# Patient Record
Sex: FEMALE | Race: WHITE | NOT HISPANIC OR LATINO | Employment: OTHER | URBAN - METROPOLITAN AREA
[De-identification: names, ages, dates, MRNs, and addresses within clinical notes are randomized per-mention and may not be internally consistent; named-entity substitution may affect disease eponyms.]

---

## 2017-06-28 ENCOUNTER — ALLSCRIPTS OFFICE VISIT (OUTPATIENT)
Dept: OTHER | Facility: OTHER | Age: 62
End: 2017-06-28

## 2017-06-28 PROCEDURE — G0145 SCR C/V CYTO,THINLAYER,RESCR: HCPCS | Performed by: OBSTETRICS & GYNECOLOGY

## 2017-06-28 PROCEDURE — 88141 CYTOPATH C/V INTERPRET: CPT | Performed by: OBSTETRICS & GYNECOLOGY

## 2017-06-28 PROCEDURE — 87624 HPV HI-RISK TYP POOLED RSLT: CPT | Performed by: OBSTETRICS & GYNECOLOGY

## 2017-06-29 ENCOUNTER — LAB REQUISITION (OUTPATIENT)
Dept: LAB | Facility: HOSPITAL | Age: 62
End: 2017-06-29
Payer: COMMERCIAL

## 2017-06-29 DIAGNOSIS — Z01.419 ENCOUNTER FOR GYNECOLOGICAL EXAMINATION WITHOUT ABNORMAL FINDING: ICD-10-CM

## 2017-07-13 LAB — HPV RRNA GENITAL QL NAA+PROBE: NORMAL

## 2017-07-14 LAB
LAB AP GYN PRIMARY INTERPRETATION: NORMAL
Lab: NORMAL
PATH INTERP SPEC-IMP: NORMAL

## 2017-07-17 ENCOUNTER — GENERIC CONVERSION - ENCOUNTER (OUTPATIENT)
Dept: OTHER | Facility: OTHER | Age: 62
End: 2017-07-17

## 2017-08-23 ENCOUNTER — APPOINTMENT (OUTPATIENT)
Dept: RADIOLOGY | Facility: MEDICAL CENTER | Age: 62
End: 2017-08-23
Payer: COMMERCIAL

## 2017-08-23 ENCOUNTER — TRANSCRIBE ORDERS (OUTPATIENT)
Dept: ADMINISTRATIVE | Facility: HOSPITAL | Age: 62
End: 2017-08-23

## 2017-08-23 DIAGNOSIS — M25.561 RIGHT KNEE PAIN, UNSPECIFIED CHRONICITY: ICD-10-CM

## 2017-08-23 DIAGNOSIS — M25.562 LEFT KNEE PAIN, UNSPECIFIED CHRONICITY: ICD-10-CM

## 2017-08-23 DIAGNOSIS — M25.562 LEFT KNEE PAIN, UNSPECIFIED CHRONICITY: Primary | ICD-10-CM

## 2017-08-23 PROCEDURE — 73564 X-RAY EXAM KNEE 4 OR MORE: CPT

## 2017-11-03 DIAGNOSIS — Z12.31 ENCOUNTER FOR SCREENING MAMMOGRAM FOR MALIGNANT NEOPLASM OF BREAST: ICD-10-CM

## 2017-11-06 ENCOUNTER — GENERIC CONVERSION - ENCOUNTER (OUTPATIENT)
Dept: OTHER | Facility: OTHER | Age: 62
End: 2017-11-06

## 2017-12-20 ENCOUNTER — ALLSCRIPTS OFFICE VISIT (OUTPATIENT)
Dept: OTHER | Facility: OTHER | Age: 62
End: 2017-12-20

## 2017-12-20 PROCEDURE — 88175 CYTOPATH C/V AUTO FLUID REDO: CPT | Performed by: OBSTETRICS & GYNECOLOGY

## 2017-12-21 NOTE — PROGRESS NOTES
Plan   A cervical Pap smear was repeated at this time  There was no blood or discharge in the vagina  Cervix appeared normal       Chief Complaint   Chief Complaint Free Text Note Form: repap for abnormal pap ascus      History of Present Illness   HPI: This 49-year-old patient has a history of leukoplakia of the vulva for which she takes Temovate cream as needed usually no more than once every 2 weeks  She did have a Pap smear at her yearly visit 2017 which showed ascus negative for high risk human papilloma virus  I have returned her visit today to repeat Pap smear  Active Problems   1  Encounter for gynecological examination without abnormal finding (V72 31) (Z01 419)   2  Encounter for screening mammogram for malignant neoplasm of breast (V76 12)     (Z12 31)   3  Lichen sclerosus et atrophicus (701 0) (L90 0)   4  Pap smear, as part of routine gynecological examination (V76 2) (Z01 419)   5  Vaginitis (616 10) (N76 0)   6  Visit for screening mammogram (V76 12) (Z12 31)    Past Medical History   1  History of Menopausal state (627 2) (N95 1)   2  History of  (normal spontaneous vaginal delivery) (650) (O80)   3  History of Pap smear, as part of routine gynecological examination (V76 2) (Z01 419)   4  History of Visit for screening mammogram (V76 12) (Z12 31)    Surgical History   1  History of Ankle Surgery    Family History   Father    1  Family history of Prostate CA  Maternal Great Grandmother    2  Family history of Ovarian cancer    Social History    · Being A Social Drinker   · Caffeine use (V49 89) (F15 90)   · Never A Smoker   · No drug use    Current Meds    1  Clobetasol Propionate E 0 05 % External Cream; apply bid x 2week then qd for 2 weeks     then every third day then stop; Therapy: [id number] to (Last Rx:2014) Ordered   2  Synthroid 50 MCG Oral Tablet; Therapy: (Recorded:2014) to Recorded    Allergies   1   No Known Drug Allergies    Vitals   Vital Signs Recorded: 11ZTC2222 37:88EP   Systolic 180   Diastolic 88   Height 5 ft 6 in   Weight 187 lb    BMI Calculated 30 18   BSA Calculated 1 94     Physical Exam        Genitourinary      External genitalia: Abnormal  -- Leukoplakia specifically of the inferior branch vagina and there is introitus going down the perineum  Vagina: Normal, no lesions or dryness appreciated  Urethra: Normal        Urethral meatus: Normal        Bladder: Normal, soft, non-tender and no prolapse or masses appreciated  Cervix: Normal, no palpable masses         Future Appointments      Date/Time Provider Specialty Site   07/11/2018 11:30 AM Cristian Rocha MD Obstetrics/Gynecology [facility]     Signatures    Electronically signed by : Mendel Song, MD; Dec 20 2017  2:51PM EST                       (Author)

## 2017-12-22 ENCOUNTER — LAB REQUISITION (OUTPATIENT)
Dept: LAB | Facility: HOSPITAL | Age: 62
End: 2017-12-22
Payer: COMMERCIAL

## 2017-12-22 DIAGNOSIS — R87.610 ATYPICAL SQUAMOUS CELLS OF UNDETERMINED SIGNIFICANCE ON CYTOLOGIC SMEAR OF CERVIX (ASC-US): ICD-10-CM

## 2017-12-28 LAB
LAB AP GYN PRIMARY INTERPRETATION: NORMAL
Lab: NORMAL

## 2018-01-10 NOTE — MISCELLANEOUS
Message   Recorded as Task   Date: 07/17/2017 01:05 PM, Created By: Aaron Putnam   Task Name: Follow Up   Assigned To: Juan Pablo Spicer   Regarding Patient: Deion Shafer, Status: In Progress   Lee Tate - 17 Jul 2017 1:05 PM     TASK CREATED  Call Eileen Hernandez her Pap smear shows ascus negative for high risk HPV  Repeat her Pap in 5 months  Taya Young - 17 Jul 2017 1:34 PM     TASK IN PROGRESS   Taya Young - 17 Jul 2017 2:22 PM     TASK EDITED  MA contacted Pt by phone today  MA informed Pt of pap test result (test performed on 7/14/17) per Dr Debbie Parekh directive  MA informed Pt that test result for high risk HPV was negative, however, test results were inconclusive (ASC-US)  MA further informed Pt that  Dr Radha Oliver recommends repeating Pap test in five months  Pt stated that she is post menopausal   MA informed Pt not to have sex three days prior to Pap test appointment  Pt informed MA that she will comply with Dr Debbie Parekh recommendation and schedule repeat Pap test in five months  Active Problems    1  Encounter for gynecological examination without abnormal finding (V72 31) (Z01 419)   2  Encounter for screening mammogram for malignant neoplasm of breast (V76 12)   (Z12 31)   3  Lichen sclerosus et atrophicus (701 0) (L90 0)   4  Pap smear, as part of routine gynecological examination (V76 2) (Z01 419)   5  Vaginitis (616 10) (N76 0)   6  Visit for screening mammogram (V76 12) (Z12 31)    Current Meds   1  Clobetasol Propionate E 0 05 % External Cream; apply bid x 2week then qd for 2 weeks   then every third day then stop; Therapy: [id number] to (Last Rx:20Jan2014) Ordered   2  Synthroid 50 MCG Oral Tablet (Levothyroxine Sodium); Therapy: (Recorded:20Jan2014) to Recorded    Allergies    1   No Known Drug Allergies    Signatures   Electronically signed by : Joao Quan MA; Jul 17 2017  2:23PM EST                       (Author)

## 2018-01-11 ENCOUNTER — GENERIC CONVERSION - ENCOUNTER (OUTPATIENT)
Dept: OTHER | Facility: OTHER | Age: 63
End: 2018-01-11

## 2018-01-12 NOTE — RESULT NOTES
Verified Results  (B) PAP (REFLEX TO HPV PLUS WHEN ASC-US) 27Jun2016 12:00AM [facility]     Test Name Result Flag Reference   PAP, LIQUID-BASED NILM     DIAGNOSIS:            Negative for intraepithelial lesion or malignancy  ADEQUACY:             Satisfactory for evaluation /                         Endocervical/transformation zone component                         present  COMMENT:              This Pap smear was screened with the assistance                         of the LocalbasePrep(TM) Imaging System and                         screened by a cytotechnologist   SPECIMEN SOURCE:      PAP (RFLX HPV PLUS WHENASC-US), CERVIX  CLINICAL INFORMATION: LMP: N/A                        Post menopausal                        Provided Diagnosis Codes: Z01 419, V72 31                                                Cervicovaginal cytology should be considered a                         screening procedure subject to false negatives                         and false positives  Results are more reliable                         when a satisfactory sample is obtained on a                         regular repetitive basis, and should be                         interpreted together with past and current                         clinical data    ELECTRONICALLY SIGNED   BY:                   Screened By: QUENTIN Miller (ASCP)   Case                         Electronically Signed 06/29/2016

## 2018-01-14 VITALS
BODY MASS INDEX: 30.05 KG/M2 | DIASTOLIC BLOOD PRESSURE: 90 MMHG | WEIGHT: 187 LBS | SYSTOLIC BLOOD PRESSURE: 150 MMHG | HEIGHT: 66 IN

## 2018-01-15 NOTE — RESULT NOTES
Verified Results  (1) THIN PREP PAP WITH IMAGING [id number] 09:47AM [facility]     Test Name Result Flag Reference   LAB AP CASE REPORT (Report)     Gynecologic Cytology Report            Case: [id number]                  Authorizing Provider: Kayla Mosquera MD     Collected:      06/28/2017           First Screen:     Janell Wilsons, CT    Received:      07/03/2017 1104        Pathologist:      Gracie Hurtado MD                                 Specimen:  LIQUID-BASED PAP, SCREENING, Cervix   HPV HIGH RISK RESULT (Report)     HPV, High Risk: HPV NEG, HPV16 NEG, HPV18 NEG      Other High Risk HPV Negative, HPV 16 Negative, HPV 18 Negative  HPV types: 16,18,31,33,35,39,45,51,52,56,58,59,66 and 68 DNA are undetectable or below the pre-set threshold  Roche???s FDA approved Manny 4800 is utilized with strict adherence to the ???s instruction  manual to test for the presence of High-Risk HPV DNA, as well as HPV 16 and HPV 18  This instrument  has been validated by our laboratory and/or by the   A negative result does not preclude the presence of HPV infection because results depend on adequate  specimen collection, absence of inhibitors and sufficient DNA to be detected  Additionally, HPV negative  results are not intended to prevent women from proceeding to colposcopy if clinically warranted  Positive HPV test results indicate the presence of any one or more of the high risk types, but since patients  are often co-infected with low-risk types it does not rule out the presence of low-risk types in patients  with mixed infections  LAB AP GYN PRIMARY INTERPRETATION      Epithelial cell abnormality  Electronically signed by Gracie Hurtado MD on 7/14/2017 at 9:47 AM   LAB AP GYN INTERPRETATION      Atypical squamous cells of undetermined significance   LAB AP GYN SPECIMEN ADEQUACY      Satisfactory for evaluation  (See note)   LAB AP GYN NOTE (Report)     - No endocervical cells identified  It is difficult to differentiate   between squamous metaplastic cells and parabasal cells in atrophic   specimens due to numerous causes including menopause, postpartum changes   and progestational agents  - Interpretation performed at [facility], [address] [facility] [address],   [address] [facility]  LAB AP GYN ADDITIONAL INFORMATION (Report)     The [email or web address]'s FDA approved ,  and ThinPrep Imaging System are   utilized with strict adherence to the 's instruction manual to   prepare gynecologic and non-gynecologic cytology specimens for the   production of ThinPrep slides as well as for gynecologic ThinPrep imaging  These processes have been validated by our laboratory and/or by the     The Pap test is not a diagnostic procedure and should not be used as the   sole means to detect cervical cancer  It is only a screening procedure to   aid in the detection of cervical cancer and its precursors  Both   false-negative and false-positive results have been experienced  Your   patient's test result should be interpreted in this context together with   the history and clinical findings     LAB AP CLINICAL INFORMATION      Encounter for gynecological examination without abnormal finding   LAB AP LMP

## 2018-01-23 VITALS
WEIGHT: 187 LBS | HEIGHT: 66 IN | BODY MASS INDEX: 30.05 KG/M2 | SYSTOLIC BLOOD PRESSURE: 144 MMHG | DIASTOLIC BLOOD PRESSURE: 88 MMHG

## 2018-02-26 NOTE — MISCELLANEOUS
Message   Recorded as Task   Date: 01/11/2018 10:22 AM, Created By: Kate Oneil   Task Name: Call Back   Assigned To: Mally Hathaway   Regarding Patient: Renee Watkins, Status: In Progress   Comment:    Timo Multaniika - 11 Jan 2018 10:22 AM     TASK CREATED  Caller: Self; Results Inquiry; [phone] (Mobile Phone)  Pt is requesting for her pap results on 12/28 she had done  Ingrid Fried - 11 Jan 2018 10:25 AM     TASK IN PROGRESS   Ingrid Fried - 11 Jan 2018 10:27 AM     TASK EDITED  Patient is aware of results  Active Problems    1  Encounter for gynecological examination without abnormal finding (V72 31) (Z01 419)   2  Encounter for screening mammogram for malignant neoplasm of breast (V76 12)   (Z12 31)   3  Lichen sclerosus et atrophicus (701 0) (L90 0)   4  Pap smear abnormality of cervix with ASCUS favoring benign (795 01) (R87 610)   5  Pap smear, as part of routine gynecological examination (V76 2) (Z01 419)   6  Vaginitis (616 10) (N76 0)   7  Visit for screening mammogram (V76 12) (Z12 31)    Current Meds   1  Clobetasol Propionate E 0 05 % External Cream; apply bid x 2week then qd for 2 weeks   then every third day then stop; Therapy: [id number] to (Last Rx:20Jan2014) Ordered   2  Synthroid 50 MCG Oral Tablet (Levothyroxine Sodium); Therapy: (Recorded:20Jan2014) to Recorded    Allergies    1   No Known Drug Allergies    Signatures   Electronically signed by : Yordan Turcios, ; Jan 11 2018 10:27AM EST                       (Author)

## 2018-06-23 DIAGNOSIS — N76.2 ACUTE VULVITIS: Primary | ICD-10-CM

## 2018-06-24 RX ORDER — CLOBETASOL PROPIONATE 0.5 MG/G
CREAM TOPICAL
Qty: 30 G | Refills: 4 | Status: SHIPPED | OUTPATIENT
Start: 2018-06-24 | End: 2019-07-17 | Stop reason: SDUPTHER

## 2018-07-11 ENCOUNTER — ANNUAL EXAM (OUTPATIENT)
Dept: OBGYN CLINIC | Facility: MEDICAL CENTER | Age: 63
End: 2018-07-11
Payer: MEDICARE

## 2018-07-11 VITALS
SYSTOLIC BLOOD PRESSURE: 138 MMHG | HEIGHT: 67 IN | BODY MASS INDEX: 30.13 KG/M2 | WEIGHT: 192 LBS | DIASTOLIC BLOOD PRESSURE: 80 MMHG

## 2018-07-11 DIAGNOSIS — Z12.31 ENCOUNTER FOR SCREENING MAMMOGRAM FOR MALIGNANT NEOPLASM OF BREAST: Primary | ICD-10-CM

## 2018-07-11 PROCEDURE — G0124 SCREEN C/V THIN LAYER BY MD: HCPCS | Performed by: PATHOLOGY

## 2018-07-11 PROCEDURE — G0101 CA SCREEN;PELVIC/BREAST EXAM: HCPCS | Performed by: OBSTETRICS & GYNECOLOGY

## 2018-07-11 PROCEDURE — G0145 SCR C/V CYTO,THINLAYER,RESCR: HCPCS | Performed by: PATHOLOGY

## 2018-07-11 RX ORDER — LEVOTHYROXINE SODIUM 0.05 MG/1
50 TABLET ORAL
COMMUNITY
Start: 2016-01-14

## 2018-07-11 RX ORDER — CLOBETASOL PROPIONATE 0.5 MG/G
CREAM TOPICAL
COMMUNITY
Start: 2014-01-20 | End: 2022-01-27 | Stop reason: SDUPTHER

## 2018-07-11 NOTE — PROGRESS NOTES
Assessment/Plan: This 25-year-old patient is seen for her annual evaluation and rechecking of her leukoplakia of the vulva and previous ASCUS on her Pap smear  No problem-specific Assessment & Plan notes found for this encounter  Subjective:      Patient ID: Liana Paez is a 70 y o  female  This 25-year-old patient has had no vaginal bleeding or episodes of vaginitis over the past year  She does have occasional vulvar itching from her leukoplakia of the vulva and this is treated with Temovate cream 0 05% as needed  She had a Pap smear June 28, 2017 which showed ASCUS  Repeat Pap smear December 20, 2017 was normal  Her bowel and bladder function are normal  She has no chronic headaches fainting spells or hot flashes  She sleeps about 7 0 5 hours at night  Review of Systems   Constitutional: Negative  HENT: Negative  Eyes: Negative  Respiratory: Negative  Cardiovascular: Negative  Gastrointestinal: Negative  Endocrine: Negative  Genitourinary: Negative  Musculoskeletal: Negative  Skin: Negative  Allergic/Immunologic: Negative  Neurological: Negative  Hematological: Negative  Psychiatric/Behavioral: Negative  Objective:      /80 (BP Location: Left arm, Patient Position: Sitting, Cuff Size: Standard)   Ht 5' 7" (1 702 m)   Wt 87 1 kg (192 lb)   BMI 30 07 kg/m²          Physical Exam   Constitutional: She is oriented to person, place, and time  She appears well-developed and well-nourished  HENT:   Head: Normocephalic  Neck: Normal range of motion  Neck supple  Cardiovascular: Normal rate, regular rhythm, normal heart sounds and intact distal pulses  Pulmonary/Chest: Effort normal and breath sounds normal    Abdominal: Soft  Bowel sounds are normal    Genitourinary: Uterus normal    Musculoskeletal: Normal range of motion  Neurological: She is alert and oriented to person, place, and time     Skin: Skin is warm and dry    Psychiatric: She has a normal mood and affect  Nursing note and vitals reviewed  pelvic exam reveals uterus to be anterior mobile normal size and well supported  No adnexal masses are present  The cervix is normal to appearance  There is no blood or discharge in the vagina  The vaginal mucosa is atrophic  The vulva does show evidence of leukoplakia  There are no ulcers or bleeding spots  Rectal exam shows no bladder masses in the rectum and no nodularity in the cul-de-sac   Breast exam is normal

## 2018-07-11 NOTE — PATIENT INSTRUCTIONS
This 49-year-old patient was told that her exam did show evidence of mild leukoplakia of the vulva  If her Pap smear is not normal will call

## 2018-07-11 NOTE — PROGRESS NOTES
Assessment/Plan:    No problem-specific Assessment & Plan notes found for this encounter  {Assess/PlanSmartLinks:10485}      Subjective:      Patient ID: Valerie Villa is a 70 y o  female      HPI    {Common ambulatory SmartLinks:33745}    Review of Systems      Objective:      /80 (BP Location: Left arm, Patient Position: Sitting, Cuff Size: Standard)   Ht 5' 7" (1 702 m)   Wt 87 1 kg (192 lb)   BMI 30 07 kg/m²          Physical Exam

## 2018-07-18 ENCOUNTER — TELEPHONE (OUTPATIENT)
Dept: OBGYN CLINIC | Facility: CLINIC | Age: 63
End: 2018-07-18

## 2018-07-18 LAB
LAB AP GYN PRIMARY INTERPRETATION: NORMAL
Lab: NORMAL
PATH INTERP SPEC-IMP: NORMAL

## 2018-07-18 NOTE — TELEPHONE ENCOUNTER
----- Message from Brian Hernandez MD sent at 7/18/2018  3:50 PM EDT -----  Please call the patient regarding her pap smear  The pap showed mild dysplasia  We should do a colposcopy

## 2018-07-18 NOTE — TELEPHONE ENCOUNTER
Patient is aware of results and that she needs a colpo  Explained what a colpo was and how it is done  Advised to take 2-3 Ibuprofen an hour before hand  Scheduled her for 09/07 in [facility] with Ashley Ponce

## 2018-08-31 ENCOUNTER — TELEPHONE (OUTPATIENT)
Dept: OBGYN CLINIC | Facility: CLINIC | Age: 63
End: 2018-08-31

## 2018-09-07 ENCOUNTER — PROCEDURE VISIT (OUTPATIENT)
Dept: OBGYN CLINIC | Facility: CLINIC | Age: 63
End: 2018-09-07
Payer: MEDICARE

## 2018-09-07 VITALS — SYSTOLIC BLOOD PRESSURE: 122 MMHG | DIASTOLIC BLOOD PRESSURE: 82 MMHG | BODY MASS INDEX: 29.82 KG/M2 | WEIGHT: 190.4 LBS

## 2018-09-07 DIAGNOSIS — R87.612 LGSIL ON PAP SMEAR OF CERVIX: Primary | ICD-10-CM

## 2018-09-07 PROCEDURE — 88305 TISSUE EXAM BY PATHOLOGIST: CPT | Performed by: PATHOLOGY

## 2018-09-07 PROCEDURE — 88344 IMHCHEM/IMCYTCHM EA MLT ANTB: CPT | Performed by: PATHOLOGY

## 2018-09-07 PROCEDURE — 57454 BX/CURETT OF CERVIX W/SCOPE: CPT | Performed by: OBSTETRICS & GYNECOLOGY

## 2018-09-07 NOTE — PATIENT INSTRUCTIONS
This 61-year-old patient had Pap smear June 28, 2017 called [facility] S  December 20, 2017 she had a normal Pap smear  On July 11, 2018 she had a Pap smear which was labeled low-grade NICOLE  Today she had a colposcopy performed  She was advised to wear a pads for her vaginal discharge from the Monsel's and any bleeding that might occur until this ceases in the next 48-72 hours  We will call with the biopsy reports

## 2018-09-07 NOTE — PROGRESS NOTES
Procedures the patient was taken to the examining room placed in the dorsal lithotomy position speculum placed in the vagina and the cervix identified  The cervix appeared normal there was no blood or discharge in the vagina  The vaginal mucosa appeared normal  There was some mild leukoplakia involving labia  Lugol solution was placed on the cervix and some areas of filled cyst standing or identified the anterior and posterior lip of the cervix  Biopsies of the sites were taken with a punch biopsy and endocervical curettage was taken  Monsel's solution was placed against the biopsy sites for hemostasis  No active bleeding was identified  The instruments removed from the vagina and the patient allowed to go home    She tolerated the procedure well

## 2018-09-14 ENCOUNTER — TELEPHONE (OUTPATIENT)
Dept: OBGYN CLINIC | Facility: CLINIC | Age: 63
End: 2018-09-14

## 2018-09-14 NOTE — TELEPHONE ENCOUNTER
----- Message from Dinesh Aldana MD sent at 9/14/2018  9:04 AM EDT -----  Please call the patient regarding her cervical biopsies  The biopsies did show evidence of a mild dysplasia  Schedule her for cryo surgery of the cervix in the office to be performed 4 weeks after her biopsy

## 2018-09-14 NOTE — TELEPHONE ENCOUNTER
Patient returned call - she is aware of biopsy results  Explaiend what it means and that R87 would like the pt to have Cryo done  Explained what that is and how it is done  Scheduled pt for 10/09  Advised pt to take 2-3 ibuprofen an hour before hand

## 2018-10-09 ENCOUNTER — PROCEDURE VISIT (OUTPATIENT)
Dept: OBGYN CLINIC | Facility: CLINIC | Age: 63
End: 2018-10-09
Payer: MEDICARE

## 2018-10-09 VITALS — DIASTOLIC BLOOD PRESSURE: 78 MMHG | SYSTOLIC BLOOD PRESSURE: 130 MMHG | BODY MASS INDEX: 29.73 KG/M2 | WEIGHT: 189.8 LBS

## 2018-10-09 DIAGNOSIS — N87.0 CERVICAL DYSPLASIA, MILD: ICD-10-CM

## 2018-10-09 PROCEDURE — 57511 CRYOCAUTERY OF CERVIX: CPT | Performed by: OBSTETRICS & GYNECOLOGY

## 2018-10-09 NOTE — PATIENT INSTRUCTIONS
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

## 2018-10-09 NOTE — PROGRESS NOTES
Procedures the patient was taken to the examining room placed in the lithotomy position with her legs in the stirrups  The cervix was identified with a speculum in the vagina  The neck will sized piece of metal was attached to the cryo surgery unit and placed against the cervix with some Vaseline on the tip of the mental instrument  Cryo surgery was performed for 4 min  Afterwards and area about the size of a quarter around the cervix was frozen and patient was allowed to sit up and tolerated the procedure well  She was told that she may see discharge for about a week and should avoid intercourse for week  She may shower or tub a tonight  We will repeat the Pap smear in 4 months

## 2019-01-29 ENCOUNTER — TRANSCRIBE ORDERS (OUTPATIENT)
Dept: ADMINISTRATIVE | Facility: HOSPITAL | Age: 64
End: 2019-01-29

## 2019-01-29 DIAGNOSIS — R10.13 EPIGASTRIC PAIN: Primary | ICD-10-CM

## 2019-02-04 ENCOUNTER — HOSPITAL ENCOUNTER (OUTPATIENT)
Dept: RADIOLOGY | Facility: MEDICAL CENTER | Age: 64
Discharge: HOME | End: 2019-02-04
Payer: MEDICARE

## 2019-02-04 DIAGNOSIS — R10.13 EPIGASTRIC PAIN: ICD-10-CM

## 2019-02-04 PROCEDURE — 76705 ECHO EXAM OF ABDOMEN: CPT

## 2019-02-27 ENCOUNTER — OFFICE VISIT (OUTPATIENT)
Dept: OBGYN CLINIC | Facility: MEDICAL CENTER | Age: 64
End: 2019-02-27
Payer: MEDICARE

## 2019-02-27 VITALS
SYSTOLIC BLOOD PRESSURE: 132 MMHG | BODY MASS INDEX: 29.98 KG/M2 | HEIGHT: 67 IN | WEIGHT: 191 LBS | DIASTOLIC BLOOD PRESSURE: 74 MMHG

## 2019-02-27 DIAGNOSIS — N87.0 CERVICAL DYSPLASIA, MILD: Primary | ICD-10-CM

## 2019-02-27 PROCEDURE — 99212 OFFICE O/P EST SF 10 MIN: CPT | Performed by: OBSTETRICS & GYNECOLOGY

## 2019-02-27 PROCEDURE — G0145 SCR C/V CYTO,THINLAYER,RESCR: HCPCS | Performed by: OBSTETRICS & GYNECOLOGY

## 2019-02-27 NOTE — PROGRESS NOTES
Assessment/Plan: This 39-year-old patient is seen today for Pap smear  Her yearly visit in July 11, 2018 showed low-grade NICOLE  There are no diagnoses linked to this encounter  Subjective:     Patient ID: Kathya Selby is a 70 y o  female  HPI this 39-year-old patient had a routine yearly office visit July 11, 2018 at which time her Pap smear showed low-grade NICOLE  She had a colposcopy on September 7, 2018 which showed mild dysplasia of the posterior lip of the cervix  October 9, 2018 she had cryo surgery of her cervix  She had minimal discharge for few days afterwards but no active bleeding  She is seen today for her repeat Pap smear  If this is normal we will see her after her her July a 10th 2019 for yearly visit  Review of Systems      Objective:     Physical Exam  her cervix is normal to appearance  The vulva looks normal   There is no blood or discharge in the vagina

## 2019-02-27 NOTE — PATIENT INSTRUCTIONS
This 73-year-old patient was told that her cervix appeared normal  We will see her for her next yearly visit after July 10, 2019  We will call her with the results of the Pap smear done today

## 2019-03-04 ENCOUNTER — TELEPHONE (OUTPATIENT)
Dept: OBGYN CLINIC | Facility: CLINIC | Age: 64
End: 2019-03-04

## 2019-03-04 LAB
LAB AP GYN PRIMARY INTERPRETATION: NORMAL
Lab: NORMAL

## 2019-03-04 NOTE — TELEPHONE ENCOUNTER
----- Message from Gee Howe MD sent at 3/4/2019  3:08 PM EST -----  Please call the patient regarding her Pap smear  Her Pap smear was interpreted   repeat her next Pap at her next yearly visit

## 2019-03-11 ENCOUNTER — TRANSCRIBE ORDERS (OUTPATIENT)
Dept: ADMINISTRATIVE | Facility: HOSPITAL | Age: 64
End: 2019-03-11

## 2019-03-11 DIAGNOSIS — M53.3 DISORDER OF SACRUM: Primary | ICD-10-CM

## 2019-03-21 ENCOUNTER — HOSPITAL ENCOUNTER (EMERGENCY)
Facility: HOSPITAL | Age: 64
Discharge: HOME | End: 2019-03-21
Attending: EMERGENCY MEDICINE | Admitting: EMERGENCY MEDICINE
Payer: MEDICARE

## 2019-03-21 VITALS
WEIGHT: 188.05 LBS | TEMPERATURE: 98.2 F | OXYGEN SATURATION: 98 % | DIASTOLIC BLOOD PRESSURE: 70 MMHG | SYSTOLIC BLOOD PRESSURE: 137 MMHG | BODY MASS INDEX: 29.45 KG/M2 | HEART RATE: 79 BPM | RESPIRATION RATE: 16 BRPM

## 2019-03-21 DIAGNOSIS — S09.90XA MINOR HEAD INJURY, INITIAL ENCOUNTER: Primary | ICD-10-CM

## 2019-03-21 DIAGNOSIS — S00.10XA: ICD-10-CM

## 2019-03-21 PROCEDURE — 99283 EMERGENCY DEPT VISIT LOW MDM: CPT
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
## 2019-04-10 ENCOUNTER — HOSPITAL ENCOUNTER (OUTPATIENT)
Dept: NUCLEAR MEDICINE | Facility: HOSPITAL | Age: 64
Discharge: HOME | End: 2019-04-10
Payer: MEDICARE

## 2019-04-10 DIAGNOSIS — M53.3 DISORDER OF SACRUM: ICD-10-CM

## 2019-04-10 PROCEDURE — 78306 BONE IMAGING WHOLE BODY: CPT

## 2019-04-10 PROCEDURE — A9503 TC99M MEDRONATE: HCPCS

## 2019-07-17 ENCOUNTER — ANNUAL EXAM (OUTPATIENT)
Dept: OBGYN CLINIC | Facility: MEDICAL CENTER | Age: 64
End: 2019-07-17
Payer: MEDICARE

## 2019-07-17 VITALS
SYSTOLIC BLOOD PRESSURE: 120 MMHG | BODY MASS INDEX: 28.25 KG/M2 | DIASTOLIC BLOOD PRESSURE: 66 MMHG | HEIGHT: 67 IN | WEIGHT: 180 LBS

## 2019-07-17 DIAGNOSIS — Z01.419 ENCOUNTER FOR GYNECOLOGICAL EXAMINATION (GENERAL) (ROUTINE) WITHOUT ABNORMAL FINDINGS: ICD-10-CM

## 2019-07-17 DIAGNOSIS — N76.2 ACUTE VULVITIS: ICD-10-CM

## 2019-07-17 DIAGNOSIS — R87.612 LGSIL ON PAP SMEAR OF CERVIX: ICD-10-CM

## 2019-07-17 DIAGNOSIS — N87.0 MILD DYSPLASIA OF CERVIX: Primary | ICD-10-CM

## 2019-07-17 PROCEDURE — 87624 HPV HI-RISK TYP POOLED RSLT: CPT | Performed by: OBSTETRICS & GYNECOLOGY

## 2019-07-17 PROCEDURE — 88141 CYTOPATH C/V INTERPRET: CPT | Performed by: PATHOLOGY

## 2019-07-17 PROCEDURE — 88175 CYTOPATH C/V AUTO FLUID REDO: CPT | Performed by: PATHOLOGY

## 2019-07-17 PROCEDURE — 99213 OFFICE O/P EST LOW 20 MIN: CPT | Performed by: OBSTETRICS & GYNECOLOGY

## 2019-07-17 RX ORDER — CLOBETASOL PROPIONATE 0.5 MG/G
CREAM TOPICAL 2 TIMES DAILY
Qty: 30 G | Refills: 4 | Status: SHIPPED | OUTPATIENT
Start: 2019-07-17 | End: 2021-01-20 | Stop reason: SDUPTHER

## 2019-07-17 NOTE — PATIENT INSTRUCTIONS
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

## 2019-07-17 NOTE — PROGRESS NOTES
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   A Pap smear reporting ASCUS was present July 11, 2018  She did have a colposcopy September 17, 2018 which showed presence of mild dysplasia of the posterior cervical lip  Repeat Pap smear February 2019 was normal   She occasionally uses Temovate cream for relief of vulvar itching at bedtime  She does sleep about 7 hours at night  She did have a bone scan April 10, 2019 which showed normal bone changes except for arthritic changes in certain areas  She does have gallstones  She had a normal mammogram on her last mammogram   Her weight did go down 12 lb over the past year to 180  Her blood pressure is 120/66  Review of Systems   Constitutional: Negative  HENT: Negative  Eyes: Negative  Respiratory: Negative  Cardiovascular: Negative  Gastrointestinal: Negative  Endocrine: Negative  Genitourinary: Negative  Musculoskeletal: Negative  Skin: Negative  Allergic/Immunologic: Negative  Neurological: Negative  Hematological: Negative  Psychiatric/Behavioral: Negative  Objective:      /66 (BP Location: Left arm, Patient Position: Sitting, Cuff Size: Large)   Ht 5' 7" (1 702 m)   Wt 81 6 kg (180 lb)   BMI 28 19 kg/m²          Physical Exam   Constitutional: She is oriented to person, place, and time  She appears well-developed and well-nourished  HENT:   Head: Normocephalic  Neck: Normal range of motion  Neck supple  Cardiovascular: Normal rate, regular rhythm, normal heart sounds and intact distal pulses     Pulmonary/Chest: Effort normal and breath sounds normal    Abdominal: Soft  Bowel sounds are normal    Genitourinary: Uterus normal    Genitourinary Comments: She does have leukoplakia changes in the skin of the labia majora bilaterally and around the perianal area  Musculoskeletal: Normal range of motion  Neurological: She is alert and oriented to person, place, and time  Skin: Skin is warm and dry  Psychiatric: She has a normal mood and affect  Nursing note and vitals reviewed  breast exam is normal   Pelvic exam reveals uterus to be anterior mobile normal size  No adnexal masses are present  The cervix is normal to appearance  There is no blood or discharge in the vagina  The vulva does show leukoplakia of the labia majora bilaterally and the romina anal area

## 2019-07-24 ENCOUNTER — TELEPHONE (OUTPATIENT)
Dept: OBGYN CLINIC | Facility: CLINIC | Age: 64
End: 2019-07-24

## 2019-07-24 LAB
HPV HR 12 DNA CVX QL NAA+PROBE: NEGATIVE
HPV16 DNA CVX QL NAA+PROBE: NEGATIVE
HPV18 DNA CVX QL NAA+PROBE: NEGATIVE

## 2019-07-26 ENCOUNTER — TELEPHONE (OUTPATIENT)
Dept: OBGYN CLINIC | Facility: CLINIC | Age: 64
End: 2019-07-26

## 2019-07-26 NOTE — TELEPHONE ENCOUNTER
----- Message from David Mackey MD sent at 7/26/2019 10:38 AM EDT -----  Please call the patient regarding her Pap smear  Her Pap smear showed ASCUS but negative for high risk human papilloma virus    Repeat the Pap in 6 months

## 2019-07-29 LAB
LAB AP GYN PRIMARY INTERPRETATION: ABNORMAL
Lab: ABNORMAL
PATH INTERP SPEC-IMP: ABNORMAL

## 2019-08-01 ENCOUNTER — APPOINTMENT (EMERGENCY)
Dept: RADIOLOGY | Facility: HOSPITAL | Age: 64
End: 2019-08-01
Payer: COMMERCIAL

## 2019-08-01 ENCOUNTER — HOSPITAL ENCOUNTER (EMERGENCY)
Facility: HOSPITAL | Age: 64
Discharge: HOME | End: 2019-08-01
Attending: EMERGENCY MEDICINE | Admitting: EMERGENCY MEDICINE
Payer: COMMERCIAL

## 2019-08-01 VITALS
SYSTOLIC BLOOD PRESSURE: 136 MMHG | OXYGEN SATURATION: 98 % | HEART RATE: 68 BPM | WEIGHT: 184.3 LBS | TEMPERATURE: 97.7 F | DIASTOLIC BLOOD PRESSURE: 67 MMHG | RESPIRATION RATE: 16 BRPM

## 2019-08-01 DIAGNOSIS — S22.49XA RIB FRACTURES: Primary | ICD-10-CM

## 2019-08-01 PROBLEM — D18.03 LIVER HEMANGIOMA: Status: ACTIVE | Noted: 2019-08-01
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
## 2019-08-01 PROBLEM — R91.1 LUNG NODULE: Status: ACTIVE | Noted: 2019-08-01

## 2019-08-01 LAB
BASE EXCESS BLDA CALC-SCNC: 3 MMOL/L (ref -2–3)
CA-I BLD-SCNC: 1.12 MMOL/L (ref 1.12–1.32)
GLUCOSE SERPL-MCNC: 99 MG/DL (ref 65–140)
HCO3 BLDA-SCNC: 27.2 MMOL/L (ref 24–30)
HCT VFR BLD CALC: 40 % (ref 34.8–46.1)
HGB BLDA-MCNC: 13.6 G/DL (ref 11.5–15.4)
PCO2 BLD: 28 MMOL/L (ref 21–32)
PCO2 BLD: 41.2 MM HG (ref 42–50)
PH BLD: 7.43 [PH] (ref 7.3–7.4)
PO2 BLD: 31 MM HG (ref 35–45)
POTASSIUM BLD-SCNC: 4.5 MMOL/L (ref 3.5–5.3)
SAO2 % BLD FROM PO2: 60 % (ref 95–98)
SODIUM BLD-SCNC: 138 MMOL/L (ref 136–145)
SPECIMEN SOURCE: ABNORMAL

## 2019-08-01 PROCEDURE — 84132 ASSAY OF SERUM POTASSIUM: CPT

## 2019-08-01 PROCEDURE — 82330 ASSAY OF CALCIUM: CPT

## 2019-08-01 PROCEDURE — 84295 ASSAY OF SERUM SODIUM: CPT

## 2019-08-01 PROCEDURE — 74177 CT ABD & PELVIS W/CONTRAST: CPT

## 2019-08-01 PROCEDURE — 82803 BLOOD GASES ANY COMBINATION: CPT

## 2019-08-01 PROCEDURE — 71260 CT THORAX DX C+: CPT

## 2019-08-01 PROCEDURE — 85014 HEMATOCRIT: CPT

## 2019-08-01 PROCEDURE — 99285 EMERGENCY DEPT VISIT HI MDM: CPT

## 2019-08-01 PROCEDURE — 99282 EMERGENCY DEPT VISIT SF MDM: CPT | Performed by: EMERGENCY MEDICINE

## 2019-08-01 PROCEDURE — 82947 ASSAY GLUCOSE BLOOD QUANT: CPT

## 2019-08-01 RX ORDER — DOCUSATE SODIUM 100 MG/1
100 CAPSULE, LIQUID FILLED ORAL 2 TIMES DAILY
Qty: 10 CAPSULE | Refills: 0 | Status: SHIPPED | OUTPATIENT
Start: 2019-08-01 | End: 2019-08-15 | Stop reason: ALTCHOICE

## 2019-08-01 RX ORDER — SENNOSIDES 8.6 MG
650 CAPSULE ORAL EVERY 8 HOURS PRN
Qty: 30 TABLET | Refills: 0 | Status: SHIPPED | OUTPATIENT
Start: 2019-08-01 | End: 2020-02-03 | Stop reason: ALTCHOICE

## 2019-08-01 RX ORDER — SENNOSIDES 8.6 MG
1 TABLET ORAL
Qty: 120 EACH | Refills: 0 | Status: SHIPPED | OUTPATIENT
Start: 2019-08-01 | End: 2019-08-15 | Stop reason: ALTCHOICE

## 2019-08-01 RX ORDER — IBUPROFEN 400 MG/1
400 TABLET ORAL EVERY 6 HOURS PRN
Refills: 0
Start: 2019-08-01

## 2019-08-01 RX ORDER — OXYCODONE HYDROCHLORIDE 5 MG/1
TABLET ORAL
Qty: 20 TABLET | Refills: 0 | Status: SHIPPED | OUTPATIENT
Start: 2019-08-01 | End: 2019-08-15 | Stop reason: ALTCHOICE

## 2019-08-01 RX ADMIN — IOHEXOL 100 ML: 350 INJECTION, SOLUTION INTRAVENOUS at 12:21

## 2019-08-01 NOTE — INCIDENTAL FINDINGS
The following findings require follow up:  Radiographic finding   Findin mm noncalcified nodule right upper lobe with unknown long-term stability  Based on current Fleischner Society 2017 Guidelines on incidental pulmonary nodule, no routine follow-up is needed if the patient is considered low risk for lung cancer  If the   patient is considered high risk for lung cancer, 12 month follow-up non-contrast chest CT is recommended      2 9 cm hemangioma right lobe of liver      Cholelithiasis      Sigmoid diverticulosis       Follow up required: Possible 12 month follow up CT chest   Follow up should be done within PCP in 1 month(s)

## 2019-08-01 NOTE — DISCHARGE INSTRUCTIONS
Seek medical attn if you develop worsening chest pain or shortness of breath, dizziness/lightheadness, fevers/chills or sweats  No heavy lifting, pushing or pulling >10 pounds  No strenuous physical activity  No work or driving while taking narcotic pain medications and until cleared by trauma
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
## 2019-08-15 ENCOUNTER — OFFICE VISIT (OUTPATIENT)
Dept: SURGERY | Facility: CLINIC | Age: 64
End: 2019-08-15
Payer: COMMERCIAL

## 2019-08-15 VITALS
BODY MASS INDEX: 28.47 KG/M2 | HEIGHT: 67 IN | TEMPERATURE: 96.1 F | SYSTOLIC BLOOD PRESSURE: 118 MMHG | WEIGHT: 181.4 LBS | DIASTOLIC BLOOD PRESSURE: 78 MMHG | HEART RATE: 61 BPM

## 2019-08-15 DIAGNOSIS — S22.41XD CLOSED FRACTURE OF MULTIPLE RIBS OF RIGHT SIDE WITH ROUTINE HEALING, SUBSEQUENT ENCOUNTER: Primary | ICD-10-CM

## 2019-08-15 PROCEDURE — 99213 OFFICE O/P EST LOW 20 MIN: CPT | Performed by: PHYSICIAN ASSISTANT

## 2019-08-15 RX ORDER — NAPROXEN 375 MG/1
375 TABLET ORAL 2 TIMES DAILY WITH MEALS
COMMUNITY
End: 2019-08-15 | Stop reason: ALTCHOICE
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
## 2019-08-15 NOTE — PATIENT INSTRUCTIONS
May begin resuming some light upper body activities, but limit lifting to 15-20 pounds  Expect at least 2 more weeks of pain and limited activity  Recommend that you remain pain free for at least 24 hours prior to increasing activity  Continue over the counter pain medication as needed  Re-evaluation in [address] in 2-3 weeks
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
## 2019-08-27 ENCOUNTER — TRANSCRIBE ORDERS (OUTPATIENT)
Dept: ADMINISTRATIVE | Facility: HOSPITAL | Age: 64
End: 2019-08-27

## 2019-08-27 DIAGNOSIS — N63.41 SUBAREOLAR MASS OF RIGHT BREAST: Primary | ICD-10-CM

## 2019-08-28 ENCOUNTER — HOSPITAL ENCOUNTER (OUTPATIENT)
Dept: ULTRASOUND IMAGING | Facility: CLINIC | Age: 64
Discharge: HOME | End: 2019-08-28

## 2019-08-28 ENCOUNTER — HOSPITAL ENCOUNTER (OUTPATIENT)
Dept: MAMMOGRAPHY | Facility: CLINIC | Age: 64
Discharge: HOME | End: 2019-08-28
Payer: COMMERCIAL

## 2019-08-28 VITALS — WEIGHT: 185 LBS | HEIGHT: 67 IN | BODY MASS INDEX: 29.03 KG/M2

## 2019-08-28 DIAGNOSIS — N63.41 SUBAREOLAR MASS OF RIGHT BREAST: ICD-10-CM

## 2019-08-28 PROCEDURE — 77065 DX MAMMO INCL CAD UNI: CPT

## 2019-08-28 PROCEDURE — G0279 TOMOSYNTHESIS, MAMMO: HCPCS

## 2020-01-08 ENCOUNTER — HOSPITAL ENCOUNTER (OUTPATIENT)
Dept: RADIOLOGY | Facility: MEDICAL CENTER | Age: 65
Discharge: HOME | End: 2020-01-08
Payer: MEDICARE

## 2020-01-08 VITALS — BODY MASS INDEX: 29.03 KG/M2 | WEIGHT: 185 LBS | HEIGHT: 67 IN

## 2020-01-08 DIAGNOSIS — Z12.31 VISIT FOR SCREENING MAMMOGRAM: ICD-10-CM

## 2020-01-08 DIAGNOSIS — Z01.419 ENCOUNTER FOR GYNECOLOGICAL EXAMINATION (GENERAL) (ROUTINE) WITHOUT ABNORMAL FINDINGS: ICD-10-CM

## 2020-01-08 PROCEDURE — 77063 BREAST TOMOSYNTHESIS BI: CPT

## 2020-01-08 PROCEDURE — 77067 SCR MAMMO BI INCL CAD: CPT

## 2020-01-13 ENCOUNTER — OFFICE VISIT (OUTPATIENT)
Dept: OBGYN CLINIC | Facility: CLINIC | Age: 65
End: 2020-01-13
Payer: MEDICARE

## 2020-01-13 VITALS — BODY MASS INDEX: 28.66 KG/M2 | WEIGHT: 183 LBS | SYSTOLIC BLOOD PRESSURE: 138 MMHG | DIASTOLIC BLOOD PRESSURE: 90 MMHG

## 2020-01-13 DIAGNOSIS — R87.619 ABNORMAL CERVICAL PAPANICOLAOU SMEAR, UNSPECIFIED ABNORMAL PAP FINDING: Primary | ICD-10-CM

## 2020-01-13 PROCEDURE — 88141 CYTOPATH C/V INTERPRET: CPT | Performed by: PATHOLOGY

## 2020-01-13 PROCEDURE — 88175 CYTOPATH C/V AUTO FLUID REDO: CPT | Performed by: PATHOLOGY

## 2020-01-13 PROCEDURE — 99213 OFFICE O/P EST LOW 20 MIN: CPT | Performed by: NURSE PRACTITIONER

## 2020-01-13 NOTE — ASSESSMENT & PLAN NOTE
Pap was collected today, per Dr Mario Rodriguez recommendations  Advised we will advise as indicated based on today's results  Pap hx is as follows:  6/2016: normal cyto; no cotesting   6/2017: ASCUS; neg HPV    12/2017: normal pap   7/2018: pap with LSIL/  9/2018: colpo with CIN1  2/2019: normal cyto; no cotesting   7/2019: ASCUS, neg HPV

## 2020-01-13 NOTE — PROGRESS NOTES
Assessment/Plan:    Abnormal cervical Papanicolaou smear  Pap was collected today, per Dr Jose Spicer recommendations  Advised we will advise as indicated based on today's results  Pap hx is as follows:  6/2016: normal cyto; no cotesting   6/2017: ASCUS; neg HPV    12/2017: normal pap   7/2018: pap with LSIL/  9/2018: colpo with CIN1  2/2019: normal cyto; no cotesting   7/2019: ASCUS, neg HPV  Diagnoses and all orders for this visit:    Abnormal cervical Papanicolaou smear, unspecified abnormal pap finding  -     Liquid-based pap, screening          Subjective:      Patient ID: Marilyn Quiroz is a 75 y o  female  This patient presents for 6 month repap  Per Dr Alda Barajas  See pap history as listed under A&P  She denies acute gyn complaints   Does have h/o intermittent vulvar pruritis but not symptomatic at this time       The following portions of the patient's history were reviewed and updated as appropriate: allergies, current medications, past family history, past medical history, past social history, past surgical history and problem list     Review of Systems   Constitutional: Negative  Respiratory: Negative  Cardiovascular: Negative  Gastrointestinal: Negative  Genitourinary: Negative  Musculoskeletal: Negative  Skin: Negative  Neurological: Negative  Psychiatric/Behavioral: Negative  Objective:      /90   Wt 83 kg (183 lb)   BMI 28 66 kg/m²          Physical Exam   Constitutional: She is oriented to person, place, and time  She appears well-developed and well-nourished  HENT:   Head: Normocephalic and atraumatic  Eyes: Pupils are equal, round, and reactive to light  Neck: Normal range of motion  Pulmonary/Chest: Effort normal    Abdominal: Hernia confirmed negative in the right inguinal area and confirmed negative in the left inguinal area     Genitourinary: Rectum normal and uterus normal        There is no rash, tenderness, lesion or injury on the right labia  There is no rash, tenderness, lesion or injury on the left labia  Cervix exhibits no motion tenderness, no discharge and no friability  Right adnexum displays no mass, no tenderness and no fullness  Left adnexum displays no mass, no tenderness and no fullness  No erythema, tenderness or bleeding in the vagina  No vaginal discharge found  Musculoskeletal: Normal range of motion  Lymphadenopathy:        Right: No inguinal adenopathy present  Left: No inguinal adenopathy present  Neurological: She is alert and oriented to person, place, and time  Skin: Skin is warm and dry  Psychiatric: She has a normal mood and affect   Her behavior is normal  Judgment and thought content normal

## 2020-01-17 LAB
LAB AP GYN PRIMARY INTERPRETATION: ABNORMAL
Lab: ABNORMAL
PATH INTERP SPEC-IMP: ABNORMAL

## 2020-01-22 ENCOUNTER — TELEPHONE (OUTPATIENT)
Dept: OBGYN CLINIC | Facility: CLINIC | Age: 65
End: 2020-01-22

## 2020-01-22 NOTE — TELEPHONE ENCOUNTER
----- Message from Lala Boateng [place] sent at 1/17/2020 12:54 PM EST -----  Pap with LSIL   Colpo is indicated per ASCCP guidelines   Please notify patient and schedule this at her earliest convenience

## 2020-01-22 NOTE — TELEPHONE ENCOUNTER
Pt returned my call- I advised as directed  Pt scheduled for  02/03/2020 [place] with dr Fady Alves  I explained to pt the procedure  Pt was grateful for answering all her questions and concerns  Information packet on colpo sent to patient home address on file, pt was informed

## 2020-02-03 ENCOUNTER — PROCEDURE VISIT (OUTPATIENT)
Dept: OBGYN CLINIC | Facility: MEDICAL CENTER | Age: 65
End: 2020-02-03
Payer: MEDICARE

## 2020-02-03 VITALS — SYSTOLIC BLOOD PRESSURE: 130 MMHG | BODY MASS INDEX: 28.51 KG/M2 | DIASTOLIC BLOOD PRESSURE: 70 MMHG | WEIGHT: 182 LBS

## 2020-02-03 DIAGNOSIS — R87.612 LGSIL OF CERVIX OF UNDETERMINED SIGNIFICANCE: Primary | ICD-10-CM

## 2020-02-03 PROCEDURE — 88344 IMHCHEM/IMCYTCHM EA MLT ANTB: CPT | Performed by: PATHOLOGY

## 2020-02-03 PROCEDURE — 88342 IMHCHEM/IMCYTCHM 1ST ANTB: CPT | Performed by: PATHOLOGY

## 2020-02-03 PROCEDURE — 88305 TISSUE EXAM BY PATHOLOGIST: CPT | Performed by: PATHOLOGY

## 2020-02-03 PROCEDURE — 57454 BX/CURETT OF CERVIX W/SCOPE: CPT | Performed by: OBSTETRICS & GYNECOLOGY

## 2020-02-03 NOTE — PROGRESS NOTES
Colposcopy  Date/Time: 2/3/2020 7:36 AM  Performed by: Amrita Carty MD  Authorized by: Amrita Carty MD     Consent:     Consent obtained:  Verbal    Consent given by:  Patient    Patient questions answered: yes      Patient agrees, verbalizes understanding, and wants to proceed: yes    Pre-procedure:     Prepped with: acetic acid    Indication:     Indication:  LSIL  Procedure:     Procedure: Colposcopy w/ cervical biopsy and ECC      [place] speculum was placed in the vagina: yes      Under colposcopic examination the transition zone was seen in entirety: no      Endocervix was curetted using a Kevorkian curette: yes      Cervical biopsy performed with a cervical biopsy punch: yes      Monsel's solution was applied: yes      Biopsy(s): yes      Location:  6, 9    Specimen to pathology: yes    Post-procedure:     Findings: White epithelium      Findings comment:  Vasculature    Impression: Low grade cervical dysplasia      Patient tolerance of procedure:   Tolerated well, no immediate complications

## 2020-02-03 NOTE — PATIENT INSTRUCTIONS
Colposcopy   WHAT YOU NEED TO KNOW:   You may have light bleeding or spotting after the procedure  If a biopsy was taken, you may have cramping and bleeding for several days  If medicine was used to control bleeding, you may have brown or black discharge  DISCHARGE INSTRUCTIONS:   Seek care immediately if:   · You have severe pain in your lower abdomen  · You soak through 1 sanitary pad in 1 hour or less  · You feel weak, dizzy, or faint  Contact your healthcare provider if:   · You have a fever, chills, or foul-smelling discharge  · You have bleeding with clots  · Your pain gets worse or does not get better after you take pain medicine  · You have questions or concerns about your condition or care  Medicines:   · NSAIDs , such as ibuprofen, help decrease swelling, pain, and fever  NSAIDs can cause stomach bleeding or kidney problems in certain people  If you take blood thinner medicine, always ask your healthcare provider if NSAIDs are safe for you  Always read the medicine label and follow directions  · Take your medicine as directed  Contact your healthcare provider if you think your medicine is not helping or if you have side effects  Tell him or her if you are allergic to any medicine  Keep a list of the medicines, vitamins, and herbs you take  Include the amounts, and when and why you take them  Bring the list or the pill bottles to follow-up visits  Carry your medicine list with you in case of an emergency  Activity:  If no biopsy was taken, you can resume your usual activities after the procedure  If a biopsy was taken, rest as directed  Do not exercise, play sports, or lift anything heavier than 5 pounds  Ask your healthcare provider when you can return to your usual activities  Do not put anything in your vagina for 2 weeks: If a biopsy was taken, do not douche, use medicines in your vagina, or have sex  Do not use tampons  Instead, wear a sanitary pad for bleeding     Follow up with your healthcare provider as directed:  Write down your questions so you remember to ask them during your visits  © 2017 [employer] Information is for End User's use only and may not be sold, redistributed or otherwise used for commercial purposes  All illustrations and images included in CareNotes® are the copyrighted property of A D A M , Inc  or [employer]  The above information is an  only  It is not intended as medical advice for individual conditions or treatments  Talk to your doctor, nurse or pharmacist before following any medical regimen to see if it is safe and effective for you

## 2020-02-14 ENCOUNTER — TELEPHONE (OUTPATIENT)
Dept: OBGYN CLINIC | Facility: CLINIC | Age: 65
End: 2020-02-14

## 2020-02-14 NOTE — TELEPHONE ENCOUNTER
Pt called inquiring if bx & tissue results are completed   Pt had a colpo with bx & ECC on 2/3/20  No results available yet  Advised pt will call when results are back

## 2020-02-20 ENCOUNTER — TELEPHONE (OUTPATIENT)
Dept: OBGYN CLINIC | Facility: CLINIC | Age: 65
End: 2020-02-20

## 2020-02-20 NOTE — TELEPHONE ENCOUNTER
Spoke with Pt today via phone call  Pt informed that tissue exam completed on 2/3/20 result showed low grade squamous intraepithelial lesion (mild dysplasia - no changes) per Dr Princess Baird review of said result  Pt advised to complete follow up Pap smear test in one year as recommended by Dr Hortencia Ashford  Reiterated to Pt that if she has any questions/concerns to contact office

## 2020-06-21 ENCOUNTER — OFFICE VISIT (OUTPATIENT)
Dept: URGENT CARE | Facility: MEDICAL CENTER | Age: 65
End: 2020-06-21
Payer: MEDICARE

## 2020-06-21 VITALS
HEART RATE: 57 BPM | RESPIRATION RATE: 18 BRPM | OXYGEN SATURATION: 99 % | TEMPERATURE: 98.6 F | SYSTOLIC BLOOD PRESSURE: 140 MMHG | HEIGHT: 67 IN | WEIGHT: 180 LBS | BODY MASS INDEX: 28.25 KG/M2 | DIASTOLIC BLOOD PRESSURE: 70 MMHG

## 2020-06-21 DIAGNOSIS — L23.7 POISON IVY DERMATITIS: Primary | ICD-10-CM

## 2020-06-21 PROCEDURE — 99213 OFFICE O/P EST LOW 20 MIN: CPT | Performed by: PHYSICIAN ASSISTANT

## 2020-06-21 PROCEDURE — G0463 HOSPITAL OUTPT CLINIC VISIT: HCPCS | Performed by: PHYSICIAN ASSISTANT

## 2020-06-21 RX ORDER — PREDNISONE 10 MG/1
10 TABLET ORAL 2 TIMES DAILY WITH MEALS
Qty: 34 TABLET | Refills: 0 | Status: SHIPPED | OUTPATIENT
Start: 2020-06-21 | End: 2020-07-01

## 2021-01-20 ENCOUNTER — ANNUAL EXAM (OUTPATIENT)
Dept: OBGYN CLINIC | Facility: MEDICAL CENTER | Age: 66
End: 2021-01-20
Payer: MEDICARE

## 2021-01-20 VITALS
WEIGHT: 178.8 LBS | HEIGHT: 65 IN | BODY MASS INDEX: 29.79 KG/M2 | SYSTOLIC BLOOD PRESSURE: 124 MMHG | DIASTOLIC BLOOD PRESSURE: 82 MMHG

## 2021-01-20 DIAGNOSIS — Z78.0 POSTMENOPAUSAL STATUS: ICD-10-CM

## 2021-01-20 DIAGNOSIS — Z12.31 ENCOUNTER FOR SCREENING MAMMOGRAM FOR MALIGNANT NEOPLASM OF BREAST: ICD-10-CM

## 2021-01-20 DIAGNOSIS — Z01.419 ENCOUNTER FOR GYNECOLOGICAL EXAMINATION (GENERAL) (ROUTINE) WITHOUT ABNORMAL FINDINGS: Primary | ICD-10-CM

## 2021-01-20 DIAGNOSIS — R87.619 ABNORMAL CERVICAL PAPANICOLAOU SMEAR, UNSPECIFIED ABNORMAL PAP FINDING: ICD-10-CM

## 2021-01-20 DIAGNOSIS — N76.2 ACUTE VULVITIS: ICD-10-CM

## 2021-01-20 DIAGNOSIS — Z01.42 PAP SMEAR TO CONFIRM NORMAL AFTER ABNORMAL RESULT: ICD-10-CM

## 2021-01-20 PROCEDURE — 87624 HPV HI-RISK TYP POOLED RSLT: CPT | Performed by: NURSE PRACTITIONER

## 2021-01-20 PROCEDURE — G0124 SCREEN C/V THIN LAYER BY MD: HCPCS | Performed by: PATHOLOGY

## 2021-01-20 PROCEDURE — G0145 SCR C/V CYTO,THINLAYER,RESCR: HCPCS | Performed by: PATHOLOGY

## 2021-01-20 PROCEDURE — G0101 CA SCREEN;PELVIC/BREAST EXAM: HCPCS | Performed by: NURSE PRACTITIONER

## 2021-01-20 RX ORDER — CLOBETASOL PROPIONATE 0.5 MG/G
CREAM TOPICAL 2 TIMES DAILY
Qty: 30 G | Refills: 4 | Status: SHIPPED | OUTPATIENT
Start: 2021-01-20 | End: 2022-01-27 | Stop reason: SDUPTHER

## 2021-01-20 NOTE — ASSESSMENT & PLAN NOTE
Pap hx is as follows:  6/2016: normal cyto; no cotesting   6/2017: ASCUS; neg HPV    12/2017: normal pap   7/2018: pap with LSIL/  9/2018: colpo with CIN1  2/2019: normal cyto; no cotesting   7/2019: ASCUS, neg HPV     1/2020: LSIL  2/2020: colpo and ECC benign    Pap with cotesting was collected today   Will advise as indicated with results

## 2021-01-20 NOTE — ASSESSMENT & PLAN NOTE
Benign findings on routine gyn exam  Recommended monthly SBE, annual CBE and annual screening mammo  ASCCP guidelines reviewed and diagnostic pap with cotesting was collected today  DEXA order was provided today and colonoscopy noted to be up to date (last done at age 59 and repeat q10 per pt)  The patient denies STI risk factors and declines testing at this time  Reviewed diet/activity recommendations:  Encouraged daily Ca++ and vitamin D intake as well as daily weight bearing exercise for promotion of bone health    Discussed postmenopausal considerations and symptoms to report  RTO in one year for routine annual gyn exam or sooner PRN

## 2021-01-21 NOTE — PROGRESS NOTES
Assessment/Plan:    Abnormal cervical Papanicolaou smear     Pap hx is as follows:  6/2016: normal cyto; no cotesting   6/2017: ASCUS; neg HPV    12/2017: normal pap   7/2018: pap with LSIL/  9/2018: colpo with CIN1  2/2019: normal cyto; no cotesting   7/2019: ASCUS, neg HPV     1/2020: LSIL  2/2020: colpo and ECC benign    Pap with cotesting was collected today  Will advise as indicated with results     Encounter for gynecological examination (general) (routine) without abnormal findings  Benign findings on routine gyn exam  Recommended monthly SBE, annual CBE and annual screening mammo  ASCCP guidelines reviewed and diagnostic pap with cotesting was collected today  DEXA order was provided today and colonoscopy noted to be up to date (last done at age 59 and repeat q10 per pt)  The patient denies STI risk factors and declines testing at this time  Reviewed diet/activity recommendations:  Encouraged daily Ca++ and vitamin D intake as well as daily weight bearing exercise for promotion of bone health    Discussed postmenopausal considerations and symptoms to report  RTO in one year for routine annual gyn exam or sooner PRN  Diagnoses and all orders for this visit:    Encounter for gynecological examination (general) (routine) without abnormal findings    Encounter for screening mammogram for malignant neoplasm of breast  -     Mammo screening bilateral w 3d & cad; Future    Pap smear to confirm normal after abnormal result  -     Liquid-based pap, screening  -     HPV High Risk    Postmenopausal status  -     DXA bone density spine hip and pelvis; Future    Abnormal cervical Papanicolaou smear, unspecified abnormal pap finding    Acute vulvitis  -     clobetasol (TEMOVATE) 0 05 % cream; Apply topically 2 (two) times a day for 14 days          Subjective:      Patient ID: Marylu Reinoso is a 77 y o  female  This patient presents for routine annual gyn exam   Medically stable    H/o abn paps - see A&P  She denies acute gyn complaints  She denies  bleeding or spotting, VM sx, pelvic pain, breast concerns, abn discharge, bowel/bladder dysfunction, depression/anx  Monog  Denies STI concerns  Hoping to receive covid-19 vaccine soon       The following portions of the patient's history were reviewed and updated as appropriate: allergies, current medications, past family history, past medical history, past social history, past surgical history and problem list     Review of Systems   Constitutional: Negative  Respiratory: Negative  Cardiovascular: Negative  Gastrointestinal: Negative  Genitourinary: Negative  Musculoskeletal: Negative  Skin: Negative  Neurological: Negative  Psychiatric/Behavioral: Negative  Objective:      /82 (BP Location: Right arm, Patient Position: Sitting, Cuff Size: Large)   Ht 5' 5" (1 651 m)   Wt 81 1 kg (178 lb 12 8 oz)   BMI 29 75 kg/m²          Physical Exam  Constitutional:       Appearance: She is well-developed  HENT:      Head: Normocephalic and atraumatic  Eyes:      Pupils: Pupils are equal, round, and reactive to light  Neck:      Musculoskeletal: Normal range of motion and neck supple  Thyroid: No thyromegaly  Cardiovascular:      Rate and Rhythm: Normal rate and regular rhythm  Heart sounds: Normal heart sounds  Pulmonary:      Effort: Pulmonary effort is normal  No respiratory distress  Breath sounds: Normal breath sounds  No wheezing or rales  Chest:      Chest wall: No mass, deformity or tenderness  Breasts: Breasts are symmetrical          Right: No inverted nipple, mass, nipple discharge, skin change or tenderness  Left: No inverted nipple, mass, nipple discharge, skin change or tenderness  Abdominal:      General: There is no distension  Palpations: Abdomen is soft  There is no hepatomegaly, splenomegaly or mass  Tenderness: There is no abdominal tenderness   There is no guarding or rebound  Hernia: There is no hernia in the left inguinal area or right inguinal area  Genitourinary:     Labia:         Right: No rash, tenderness, lesion or injury  Left: No rash, tenderness, lesion or injury  Vagina: Normal  No foreign body  No vaginal discharge, erythema, tenderness or bleeding  Cervix: No cervical motion tenderness, discharge or friability  Uterus: Normal        Adnexa:         Right: No mass, tenderness or fullness  Left: No mass, tenderness or fullness  Rectum: Normal              Musculoskeletal: Normal range of motion  Lymphadenopathy:      Cervical: No cervical adenopathy  Skin:     General: Skin is warm and dry  Findings: No rash  Nails: There is no clubbing  Neurological:      Mental Status: She is alert and oriented to person, place, and time  Cranial Nerves: No cranial nerve deficit  Psychiatric:         Speech: Speech normal          Behavior: Behavior normal          Thought Content:  Thought content normal          Judgment: Judgment normal

## 2021-01-26 LAB
LAB AP GYN PRIMARY INTERPRETATION: ABNORMAL
Lab: ABNORMAL
PATH INTERP SPEC-IMP: ABNORMAL

## 2021-01-27 ENCOUNTER — TELEPHONE (OUTPATIENT)
Dept: OBGYN CLINIC | Facility: CLINIC | Age: 66
End: 2021-01-27

## 2021-01-27 NOTE — TELEPHONE ENCOUNTER
Pt given an appt with Dr Danielle Betancourt on 2/23  For colposcopy  Advil or motrin prior  Pt aware in [facility] office    KK notified

## 2021-01-27 NOTE — TELEPHONE ENCOUNTER
----- Message from Silas Cullen [place] sent at 1/27/2021  8:30 AM EST -----  Pap with LSIL, neg HPV   Please contact the patient and advise scheduling colpo, per ASCCP guidelines   Please send me a staff message when she is scheduled so I can note who she will be seeing and when  thanks    Pap hx is as follows:  6/2016: normal cyto; no cotesting   6/2017: ASCUS; neg HPV    12/2017: normal pap   7/2018: pap with LSIL/  9/2018: colpo with CIN1  2/2019: normal cyto; no cotesting   7/2019: ASCUS, neg HPV      1/2020: LSIL  2/2020: colpo and ECC benign

## 2021-02-23 ENCOUNTER — PROCEDURE VISIT (OUTPATIENT)
Dept: OBGYN CLINIC | Facility: CLINIC | Age: 66
End: 2021-02-23
Payer: MEDICARE

## 2021-02-23 VITALS — SYSTOLIC BLOOD PRESSURE: 130 MMHG | WEIGHT: 180.8 LBS | DIASTOLIC BLOOD PRESSURE: 98 MMHG | BODY MASS INDEX: 30.09 KG/M2

## 2021-02-23 DIAGNOSIS — R87.612 LGSIL ON PAP SMEAR OF CERVIX: Primary | ICD-10-CM

## 2021-02-23 PROCEDURE — 57454 BX/CURETT OF CERVIX W/SCOPE: CPT | Performed by: STUDENT IN AN ORGANIZED HEALTH CARE EDUCATION/TRAINING PROGRAM

## 2021-02-23 PROCEDURE — 88305 TISSUE EXAM BY PATHOLOGIST: CPT | Performed by: PATHOLOGY

## 2021-02-23 PROCEDURE — 88344 IMHCHEM/IMCYTCHM EA MLT ANTB: CPT | Performed by: PATHOLOGY

## 2021-02-23 PROCEDURE — 99214 OFFICE O/P EST MOD 30 MIN: CPT | Performed by: STUDENT IN AN ORGANIZED HEALTH CARE EDUCATION/TRAINING PROGRAM

## 2021-02-23 NOTE — ASSESSMENT & PLAN NOTE
6/2016: normal cyto; no cotesting   6/2017: ASCUS; neg HPV    12/2017: normal pap   7/2018: pap with LSIL/  9/2018: colpo with CIN1  2/2019: normal cyto; no cotesting   7/2019: ASCUS, neg HPV     1/2020: LSIL  2/2020: colpo and ECC benign  1/2021 Pap with LSIL, neg HPV     Colposcopy collected today    We did review her cervical  Cancer screening history, possibility of proceeding with LEEP procedure - declined at this time, would like to keep monitoring and avoid intervention if possible  Will be in contact re:results

## 2021-02-23 NOTE — PROGRESS NOTES
Colposcopy    Date/Time: 2/23/2021 10:02 AM  Performed by: Micaela Carlton MD  Authorized by: Micaela Carlton MD     Consent:     Consent obtained:  Verbal and written    Consent given by:  Patient    Procedural risks discussed:  Bleeding, damage to other organs and infection    Patient questions answered: yes      Patient agrees, verbalizes understanding, and wants to proceed: yes      Educational handouts given: yes      Instructions and paperwork completed: yes    Pre-procedure:     Pre-procedure timeout performed: yes      Prepped with: acetic acid    Indication:     Indication:  LSIL  Procedure:     Procedure: Colposcopy w/ cervical biopsy and ECC      Under satisfactory analgesia the patient was prepped and draped in the dorsal lithotomy position: yes      Rogers speculum was placed in the vagina: yes      Under colposcopic examination the transition zone was seen in entirety: yes      Endocervix was curetted using a Kevorkian curette: yes      Cervical biopsy performed with a cervical biopsy punch: yes      Monsel's solution was applied: yes      Biopsy(s): yes      Location:  3:00, 9:00    Specimen to pathology: yes    Post-procedure:     Findings: White epithelium      Impression: Low grade cervical dysplasia      Patient tolerance of procedure: Tolerated well, no immediate complications      Problem List Items Addressed This Visit        Other    LGSIL on Pap smear of cervix - Primary     6/2016: normal cyto; no cotesting   6/2017: ASCUS; neg HPV    12/2017: normal pap   7/2018: pap with LSIL/  9/2018: colpo with CIN1  2/2019: normal cyto; no cotesting   7/2019: ASCUS, neg HPV     1/2020: LSIL  2/2020: colpo and ECC benign  1/2021 Pap with LSIL, neg HPV     Colposcopy collected today    We did review her cervical  Cancer screening history, possibility of proceeding with LEEP procedure - declined at this time, would like to keep monitoring and avoid intervention if possible    Will be in contact re:results           Relevant Orders    Tissue Exam

## 2021-03-05 ENCOUNTER — TELEPHONE (OUTPATIENT)
Dept: OBGYN CLINIC | Facility: CLINIC | Age: 66
End: 2021-03-05

## 2021-03-05 NOTE — TELEPHONE ENCOUNTER
Pt called back and left vm  I called pt and spoke to her relaying below  She verbalized understanding

## 2021-03-05 NOTE — TELEPHONE ENCOUNTER
----- Message from Zelda Cook MD sent at 3/5/2021  9:17 AM EST -----  Please let Stefani Quiroz know the good news that her colposcopy biopsies returned benign    Plan will be for repeat Pap smear and HPV test at her annual exam   Thanks! -AMM

## 2021-05-24 ENCOUNTER — HOSPITAL ENCOUNTER (OUTPATIENT)
Dept: MAMMOGRAPHY | Facility: MEDICAL CENTER | Age: 66
Discharge: HOME | End: 2021-05-24
Payer: MEDICARE

## 2021-05-24 VITALS — BODY MASS INDEX: 28.12 KG/M2 | HEIGHT: 66 IN | WEIGHT: 175 LBS

## 2021-05-24 DIAGNOSIS — Z12.31 ENCOUNTER FOR SCREENING MAMMOGRAM FOR MALIGNANT NEOPLASM OF BREAST: ICD-10-CM

## 2021-05-24 PROCEDURE — 77067 SCR MAMMO BI INCL CAD: CPT

## 2021-05-24 PROCEDURE — 77063 BREAST TOMOSYNTHESIS BI: CPT

## 2021-06-18 ENCOUNTER — OFFICE VISIT (OUTPATIENT)
Dept: GASTROENTEROLOGY | Facility: CLINIC | Age: 66
End: 2021-06-18
Payer: MEDICARE

## 2021-06-18 VITALS
BODY MASS INDEX: 28.93 KG/M2 | HEART RATE: 57 BPM | WEIGHT: 180 LBS | DIASTOLIC BLOOD PRESSURE: 87 MMHG | HEIGHT: 66 IN | SYSTOLIC BLOOD PRESSURE: 134 MMHG

## 2021-06-18 DIAGNOSIS — R10.84 GENERALIZED ABDOMINAL PAIN: ICD-10-CM

## 2021-06-18 DIAGNOSIS — Z12.11 SCREENING FOR COLON CANCER: Primary | ICD-10-CM

## 2021-06-18 DIAGNOSIS — K80.20 CALCULUS OF GALLBLADDER WITHOUT CHOLECYSTITIS WITHOUT OBSTRUCTION: ICD-10-CM

## 2021-06-18 DIAGNOSIS — D18.03 LIVER HEMANGIOMA: ICD-10-CM

## 2021-06-18 DIAGNOSIS — R10.11 RUQ PAIN: ICD-10-CM

## 2021-06-18 DIAGNOSIS — K57.90 DIVERTICULOSIS: ICD-10-CM

## 2021-06-18 DIAGNOSIS — R17 ELEVATED BILIRUBIN: ICD-10-CM

## 2021-06-18 PROCEDURE — 99214 OFFICE O/P EST MOD 30 MIN: CPT | Performed by: NURSE PRACTITIONER
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
## 2021-06-18 NOTE — PATIENT INSTRUCTIONS
Biliary Colic   WHAT YOU NEED TO KNOW:   Biliary colic is severe pain in your upper abdomen caused by a gallbladder problem  Your gallbladder stores bile, which helps break down the fats that you eat  DISCHARGE INSTRUCTIONS:   Medicines:   · Medicines  can help decrease pain and muscle spasms  You may also need medicine to calm your stomach and stop vomiting  · Take your medicine as directed  Contact your healthcare provider if you think your medicine is not helping or you have side effects  Tell him if you are allergic to any medicine  Keep a list of the medicines, vitamins, and herbs you take  Include the amounts, and when and why you take them  Bring the list or the pill bottles to follow-up visits  Carry your medicine list with you in case of an emergency  Avoid alcohol:  Alcohol can damage your gallbladder and make your symptoms worse  Maintain a healthy weight:  Ask your healthcare provider how much you should weigh  Ask him to help you create a weight loss plan if you are overweight  Eat a variety of healthy foods:  Healthy foods include fruits, vegetables, whole-grain breads, low-fat dairy products, beans, lean meats, and fish  Foods that are high in fiber and low in fat and cholesterol may decrease your symptoms  Ask if you need to be on a special diet  Exercise:  Ask your healthcare provider about the best exercise plan for you  Exercise may help improve your symptoms  Follow up with your healthcare provider as directed:  Bring a list of any questions you have so you remember to ask them during your visits  Contact your healthcare provider if:   · You have a fever  · Your pain gets worse, even after you take medicine  · You have nausea or are vomiting  · Your skin or eyes are yellow  · You have questions or concerns about your condition or care  Return to the emergency department if:   · You have severe pain  · You feel like you are going to faint      · You are short of breath  © Copyright [address] Information is for End User's use only and may not be sold, redistributed or otherwise used for commercial purposes  All illustrations and images included in CareNotes® are the copyrighted property of [employer]  or Percy Daniels  The above information is an  only  It is not intended as medical advice for individual conditions or treatments  Talk to your doctor, nurse or pharmacist before following any medical regimen to see if it is safe and effective for you  Upper Endoscopy   AMBULATORY CARE:   What you need to know about an upper endoscopy: An upper endoscopy is also called an upper gastrointestinal (GI) endoscopy, or an esophagogastroduodenoscopy (EGD)  A scope (thin, flexible tube with a light and camera) is used to examine the walls of your upper digestive tract  The upper digestive tract includes the esophagus, stomach, and duodenum (first part of the small intestine)  An upper endoscopy is used to look for problems, such as bleeding, polyps, ulcers, or infection  How to prepare for an upper endoscopy: Your healthcare provider will talk to you about how to prepare for your procedure  You may need to not eat or drink anything except water for 6 to 12 hours before the procedure  He will tell you what medicines to take or not take on the day of your procedure  Arrange to have someone drive you home  What will happen during an upper endoscopy:   · You will be given medicine through your IV to help you relax and make you drowsy  You will also be given medicine to numb your throat  You may need to wear a plastic mouthpiece to help hold your mouth open and protect your teeth and tongue  Your healthcare provider will gently insert the endoscope through your mouth and down into your throat  You may be asked to swallow once to help move the scope  You may feel pressure in your throat but you should not feel pain   The endoscope does not restrict your breathing  · Your healthcare provider will watch the scope on a monitor  He will take pictures with the scope  He may gently inject air so he can see your digestive tract clearly  Your healthcare provider may take tissue samples and send them to the lab for tests  He may remove foreign objects, tumors, or polyps that may be blocking your digestive tract  Your healthcare provider may also insert tools with the scope to treat bleeding or place a stent (tube)  When the procedure is finished, the endoscope will be slowly removed  What will happen after an upper endoscopy: You may feel bloated, gassy, or have some abdominal discomfort  Your throat may be sore for 24 to 36 hours after the procedure  You may burp or pass gas from air that is still inside your body after your procedure  You may need to take short walks to help move the gas out  Eat small meals, if you feel bloated  Do not drive or make important decisions until the day after your procedure  Risks of an upper endoscopy: Your esophagus, stomach, or duodenum may be punctured or torn during the procedure  This is because of increased pressure as the scope and air are passing through  You may bleed more than expected or get an infection  You may have a slow or irregular heartbeat, or low blood pressure  This can cause sweating and fainting  Fluid may enter your lungs and you may have trouble breathing  These problems can be life-threatening  Call 911 if:   · You have sudden chest pain or trouble breathing  Seek care immediately if:   · You feel dizzy or faint  · You have trouble swallowing  · You have severe throat pain  · Your bowel movements are very dark or black  · Your abdomen is hard and firm and you have severe pain  · You vomit blood  Contact your healthcare provider if:   · You feel full or bloated and cannot burp or pass gas      · You have not had a bowel movement for 3 days after your procedure  · You have neck pain  · You have a fever or chills  · You have nausea or are vomiting  · You have a rash or hives  · You have questions or concerns about your endoscopy  Relieve a sore throat:  Suck on throat lozenges or crushed ice  Gargle with a small amount of warm salt water  Mix 1 teaspoon of salt and 1 cup of warm water to make salt water  Relieve gas and discomfort from bloating:  Lie on your right side with a heating pad on your abdomen  Take short walks to help pass gas  Eat small meals until bloating is relieved  Rest after your procedure: You have been given medicine to relax you  Do not  drive or make important decisions until the day after your procedure  Return to your normal activity as directed  You can usually return to work the day after your procedure  Follow up with your healthcare provider as directed:  Write down your questions so you remember to ask them during your visits  © Copyright [address] Information is for End User's use only and may not be sold, redistributed or otherwise used for commercial purposes  All illustrations and images included in CareNotes® are the copyrighted property of [employer]  or [address] [place] [address]  The above information is an  only  It is not intended as medical advice for individual conditions or treatments  Talk to your doctor, nurse or pharmacist before following any medical regimen to see if it is safe and effective for you  Colonoscopy   AMBULATORY CARE:   What you need to know about a colonoscopy:  A colonoscopy is a procedure to examine the inside of your colon (intestine) with a scope  A scope is a flexible tube with a small light and camera on the end  Polyps or tissue growths may be removed during your colonoscopy  What you need to do the week before your colonoscopy: You will need to stop taking medicines that contain aspirin or iron for 7 days before your colonoscopy   If you take a blood thinner, such as warfarin, ask when you should stop taking it  Make plans for someone to drive you home after your procedure  How to prepare for your colonoscopy: Your healthcare provider will have you prepare your bowels before your procedure  Your bowels will need to be empty before your procedure to allow him or her to see your colon clearly  You will need to do the following:  · Have only clear liquids for the entire day before your colonoscopy  Clear liquids include plain gelatin, unsweetened fruit juices, clear soup, and broth  Do not drink any liquid that is blue, red, or purple  · Follow your bowel prep as directed  There are many different preparations that can be given before a colonoscopy  Some are given over 2 hours and others over 6 hours  Some are given earlier in the afternoon the day before the colonoscopy  Others are given the day before and then the morning of the colonoscopy  With any bowel prep, stay close to the bathroom  This liquid will cause your bowels to move often  · Use an enema if directed  Your healthcare provider may tell you to use an enema to help clean out your bowels  · Do not eat or drink anything after midnight  This will help prevent problems that can happen if you vomit while under anesthesia  What will happen during your colonoscopy:   · You will be given medicine to help you relax  You will lie on your left side and raise one or both knees toward your chest  Your healthcare provider will examine your anus and use a finger to check your rectum  You may need another enema if your bowel is not empty  The scope will be lubricated and gently placed into your anus  It will then be passed through your rectum and into your colon  Water or air will be put into your colon to help clean or expand it  This is done so your healthcare provider can see your colon clearly       · Tissue samples may be taken from the walls of your bowel and sent to a lab for tests  If you have a polyp, your healthcare provider will pass a wire loop through the scope and use it to hold the polyp  The polyp is then removed from the wall of your colon  The polyps are sent to a lab for tests  Pictures of your colon may be taken during the procedure  What will happen after your colonoscopy: You may feel bloated or have some gas and abdominal discomfort  You may need to lie on your left side with a heating pad on your abdomen  Risks of a colonoscopy: You may have pain or bleeding after the scope or polyps are removed  You may also have a slow heartbeat, decreased blood pressure, or increased sweating  Your colon may tear due to the increased pressure from the scope and other instruments  This may cause bowel contents to leak out of your colon and into your abdomen  If this happens, you will need to have surgery on your colon  Call your doctor if:   · You have a large amount of bright red blood in your bowel movements  · Your abdomen is hard and firm and you have severe pain  · You have sudden trouble breathing  · You develop a rash or hives  · You have a fever within 24 hours of your procedure  · You have not had a bowel movement for 3 days after your procedure  · You have questions or concerns about your condition or care  After your colonoscopy:   · Do not lift, strain, or run  for 3 days  · Rest as much as possible  You have been given medicine to relax you  Do not  drive or make important decisions for at least 24 hours  Return to your normal activity as directed  · Relieve gas and discomfort from bloating  by lying on your left side with a heating pad on your abdomen  You may need to take short walks to help the gas move out  Eat small meals until bloating is relieved  If you had polyps removed: For 7 days after your procedure:  · Do not  take aspirin  · Do not  go on long car rides      Help prevent constipation:   · Eat a variety of healthy foods   Healthy foods include fruit, vegetables, whole-grain breads, low-fat dairy products, beans, lean meat, and fish  Ask if you need to be on a special diet  Your healthcare provider may recommend that you eat high-fiber foods such as cooked beans  Fiber helps you have regular bowel movements  · Drink liquids as directed  Adults should drink between 9 and 13 eight-ounce cups of liquid every day  Ask what amount is best for you  For most people, good liquids to drink are water, juice, and milk  · Exercise as directed  Talk to your healthcare provider about the best exercise plan for you  Exercise can help prevent constipation, decrease your blood pressure and improve your health  Follow up with your healthcare provider as directed:  Write down your questions so you remember to ask them during your visits  © Copyright [address] Information is for End User's use only and may not be sold, redistributed or otherwise used for commercial purposes  All illustrations and images included in CareNotes® are the copyrighted property of [employer] Inc  or [facility] Javier Aguilar   The above information is an  only  It is not intended as medical advice for individual conditions or treatments  Talk to your doctor, nurse or pharmacist before following any medical regimen to see if it is safe and effective for you

## 2021-07-12 ENCOUNTER — HOSPITAL ENCOUNTER (OUTPATIENT)
Dept: RADIOLOGY | Facility: MEDICAL CENTER | Age: 66
Discharge: HOME | End: 2021-07-12
Payer: MEDICARE

## 2021-07-12 DIAGNOSIS — Z78.0 POSTMENOPAUSAL STATUS: ICD-10-CM

## 2021-07-12 DIAGNOSIS — Z13.820 SCREENING FOR OSTEOPOROSIS: ICD-10-CM

## 2021-07-12 PROCEDURE — 77080 DXA BONE DENSITY AXIAL: CPT

## 2021-07-14 ENCOUNTER — TELEPHONE (OUTPATIENT)
Dept: OBGYN CLINIC | Facility: CLINIC | Age: 66
End: 2021-07-14

## 2021-07-14 ENCOUNTER — ANESTHESIA (OUTPATIENT)
Dept: GASTROENTEROLOGY | Facility: AMBULATORY SURGERY CENTER | Age: 66
End: 2021-07-14
Payer: MEDICARE

## 2021-07-14 ENCOUNTER — HOSPITAL ENCOUNTER (OUTPATIENT)
Dept: GASTROENTEROLOGY | Facility: AMBULATORY SURGERY CENTER | Age: 66
Discharge: HOME | End: 2021-07-14
Payer: MEDICARE

## 2021-07-14 ENCOUNTER — ANESTHESIA EVENT (OUTPATIENT)
Dept: GASTROENTEROLOGY | Facility: AMBULATORY SURGERY CENTER | Age: 66
End: 2021-07-14

## 2021-07-14 VITALS
BODY MASS INDEX: 27.47 KG/M2 | SYSTOLIC BLOOD PRESSURE: 145 MMHG | HEIGHT: 67 IN | RESPIRATION RATE: 16 BRPM | TEMPERATURE: 98 F | OXYGEN SATURATION: 98 % | HEART RATE: 58 BPM | DIASTOLIC BLOOD PRESSURE: 72 MMHG | WEIGHT: 175 LBS

## 2021-07-14 DIAGNOSIS — K80.20 CALCULUS OF GALLBLADDER WITHOUT CHOLECYSTITIS WITHOUT OBSTRUCTION: Primary | ICD-10-CM

## 2021-07-14 DIAGNOSIS — Z12.11 SCREENING FOR COLON CANCER: ICD-10-CM

## 2021-07-14 DIAGNOSIS — R10.11 RUQ PAIN: ICD-10-CM

## 2021-07-14 DIAGNOSIS — R10.84 GENERALIZED ABDOMINAL PAIN: ICD-10-CM

## 2021-07-14 PROCEDURE — 88305 TISSUE EXAM BY PATHOLOGIST: CPT | Performed by: PATHOLOGY

## 2021-07-14 PROCEDURE — 43239 EGD BIOPSY SINGLE/MULTIPLE: CPT | Performed by: INTERNAL MEDICINE

## 2021-07-14 PROCEDURE — 00813 ANES UPR LWR GI NDSC PX: CPT | Performed by: NURSE ANESTHETIST, CERTIFIED REGISTERED

## 2021-07-14 PROCEDURE — 45380 COLONOSCOPY AND BIOPSY: CPT | Performed by: INTERNAL MEDICINE

## 2021-07-14 PROCEDURE — 43251 EGD REMOVE LESION SNARE: CPT | Performed by: INTERNAL MEDICINE

## 2021-07-14 PROCEDURE — 45385 COLONOSCOPY W/LESION REMOVAL: CPT | Performed by: INTERNAL MEDICINE

## 2021-07-14 RX ORDER — LIDOCAINE HYDROCHLORIDE 10 MG/ML
INJECTION, SOLUTION EPIDURAL; INFILTRATION; INTRACAUDAL; PERINEURAL AS NEEDED
Status: DISCONTINUED | OUTPATIENT
Start: 2021-07-14 | End: 2021-07-14

## 2021-07-14 RX ORDER — SODIUM CHLORIDE 9 MG/ML
INJECTION, SOLUTION INTRAVENOUS CONTINUOUS PRN
Status: DISCONTINUED | OUTPATIENT
Start: 2021-07-14 | End: 2021-07-14

## 2021-07-14 RX ORDER — SODIUM CHLORIDE 9 MG/ML
20 INJECTION, SOLUTION INTRAVENOUS CONTINUOUS
Status: DISCONTINUED | OUTPATIENT
Start: 2021-07-14 | End: 2021-07-18 | Stop reason: HOSPADM

## 2021-07-14 RX ORDER — PROPOFOL 10 MG/ML
INJECTION, EMULSION INTRAVENOUS AS NEEDED
Status: DISCONTINUED | OUTPATIENT
Start: 2021-07-14 | End: 2021-07-14

## 2021-07-14 RX ORDER — SODIUM CHLORIDE 9 MG/ML
30 INJECTION, SOLUTION INTRAVENOUS CONTINUOUS
Status: DISCONTINUED | OUTPATIENT
Start: 2021-07-14 | End: 2021-07-18 | Stop reason: HOSPADM

## 2021-07-14 RX ADMIN — PROPOFOL 100 MG: 10 INJECTION, EMULSION INTRAVENOUS at 13:14

## 2021-07-14 RX ADMIN — LIDOCAINE HYDROCHLORIDE 50 MG: 10 INJECTION, SOLUTION EPIDURAL; INFILTRATION; INTRACAUDAL; PERINEURAL at 13:05

## 2021-07-14 RX ADMIN — PROPOFOL 150 MG: 10 INJECTION, EMULSION INTRAVENOUS at 13:05

## 2021-07-14 RX ADMIN — PROPOFOL 50 MG: 10 INJECTION, EMULSION INTRAVENOUS at 13:10

## 2021-07-14 RX ADMIN — PROPOFOL 100 MG: 10 INJECTION, EMULSION INTRAVENOUS at 13:24

## 2021-07-14 RX ADMIN — SODIUM CHLORIDE: 9 INJECTION, SOLUTION INTRAVENOUS at 13:00

## 2021-07-14 NOTE — DISCHARGE SUMMARY
Discharge Summary - Yoly Martínez 74 y o  female MRN: [id number]    Unit/Bed#:  Encounter: [id number]    Admission Date:  07/14/2021    Admitting Diagnosis: Screening for colon cancer [Z12 11]  Generalized abdominal pain [R10 84]  RUQ pain [R10 11]    HPI:  Abdominal pain    Procedures Performed: No orders of the defined types were placed in this encounter  Summary of Hospital Course: Tolerated procedures well    Significant Findings, Care, Treatment and Services Provided:  Small hiatal hernia and reflux was noted  Colon polyp removed  Diverticulosis present  Complications:  None    Discharge Diagnosis:  Reflux esophagitis with possible Martínez's, diverticulosis of the colon, colon polyp    Medical Problems     Resolved Problems  Date Reviewed: 6/18/2021    None                Condition at Discharge: good         Discharge instructions/Information to patient and family:   See after visit summary for information provided to patient and family  Provisions for Follow-Up Care:  See after visit summary for information related to follow-up care and any pertinent home health orders        PCP: Manjit Barnett MD    Disposition: Home

## 2021-07-14 NOTE — INTERVAL H&P NOTE
H&P reviewed  After examining the patient I find no changes in the patients condition since the H&P had been written      Vitals:    07/14/21 1222   BP: 147/95   Pulse: 59   Resp: 18   SpO2: 97%

## 2021-07-14 NOTE — ANESTHESIA PREPROCEDURE EVALUATION
Procedure:  COLONOSCOPY  EGD    Relevant Problems   CARDIO   (+) Liver hemangioma      GI/HEPATIC   (+) Liver hemangioma        Physical Exam    Airway    Mallampati score: II  TM Distance: <3 FB  Neck ROM: full     Dental   No notable dental hx     Cardiovascular  Rhythm: regular, Rate: normal, Cardiovascular exam normal    Pulmonary  Pulmonary exam normal Breath sounds clear to auscultation,     Other Findings        Anesthesia Plan  ASA Score- 2     Anesthesia Type- IV sedation with anesthesia with ASA Monitors  Additional Monitors:   Airway Plan:           Plan Factors-    Induction- intravenous  Postoperative Plan-     Informed Consent- Anesthetic plan and risks discussed with patient

## 2021-07-14 NOTE — TELEPHONE ENCOUNTER
Spoke to pt she is not on any supplements right now so reviewed what to take    ----- Message from Diana Olivares sent at 7/14/2021 10:25 AM EDT -----  DEXA with osteopenia  Please notify patient and recommend Ca++ 1200mg/day with vitd D and daily weight bearing exercise for maintenance of bone health  Repeat DEXA in ~2 years

## 2021-07-14 NOTE — DISCHARGE INSTRUCTIONS
Upper Endoscopy and Colonoscopy   WHAT YOU NEED TO KNOW:   An upper endoscopy is also called an upper gastrointestinal (GI) endoscopy, or an esophagogastroduodenoscopy (EGD)  It is a procedure to examine the inside of your esophagus, stomach, and duodenum (first part of the small intestine) with a scope  You may feel bloated, gassy, or have some abdominal discomfort after your procedure  Your throat may be sore for 24 to 36 hours  You may burp or pass gas from air that is still inside your body  A colonoscopy is a procedure to examine the inside of your colon (intestine) with a scope  Polyps or tissue growths may have been removed during your colonoscopy  It is normal to feel bloated and to have some abdominal discomfort  You should be passing gas  If you have hemorrhoids or you had polyps removed, you may have a small amount of bleeding  DISCHARGE INSTRUCTIONS:   Seek care immediately if:   · You have sudden, severe abdominal pain  · You have problems swallowing  · You have a large amount of black, sticky bowel movements or blood in your bowel movements  · You have sudden trouble breathing  · You feel weak, lightheaded, or faint or your heart beats faster than normal for you  Contact your healthcare provider if:   · You have a fever and chills  · You have nausea or are vomiting  · Your abdomen is bloated or feels full and hard  · You have abdominal pain  · You have a large amount of black, sticky bowel movements or blood in your bowel movements  · You have not had a bowel movement for 3 days after your procedure  · You have rash or hives  · You have questions or concerns about your procedure  Activity:   ·       Do not lift, strain, or run for 24 hours after your procedure  ·       Rest after your procedure  You have been given medicine to relax you  Do not drive or make important decisions until the day after your procedure   Return to your normal activity as directed  ·       Relieve gas and discomfort from bloating by lying on your right side with a heating pad on your abdomen  You may need to take short walks to help the gas move out  Eat small meals until bloating is relieved  Follow up with your healthcare provider as directed: Write down your questions so you remember to ask them during your visits  If you take a blood thinner, please review the specific instructions from your endoscopist about when you should resume it  These can be found in the Recommendation and Your Medication list sections of this After Visit Summary  Diverticulosis   WHAT YOU NEED TO KNOW:   What is diverticulosis? Diverticulosis is a condition that causes small pockets called diverticula to form in your intestine  These pockets make it difficult for bowel movements to pass through your digestive system  What causes diverticulosis? Diverticula form when muscles have to work hard to move bowel movements through the intestine  The force causes bulges to form at weak areas in the intestine  This may happen if you eat foods that are low in fiber  Fiber helps give your bowel movements more bulk so they are larger and easier to move through your colon  The following may increase your risk of diverticulosis:  · A history of constipation    · Age 36 or older    · Obesity    · Lack of exercise    What are the signs and symptoms of diverticulosis? Diverticulosis usually does not cause any signs or symptoms  It may cause any of the following in some people:  · Pain or discomfort in your lower abdomen    · Abdominal bloating    · Constipation or diarrhea    How is diverticulosis diagnosed? Your healthcare provider will examine you and ask about your bowel movements, diet, and symptoms  He or she will also ask about any medical conditions you have or medicines you take   You may need any of the following:  · Blood tests  may be done to check for signs of inflammation  · A barium enema  is an x-ray of your colon that may show diverticula  A tube is put into your anus, and a liquid called barium is put through the tube  Barium is used so that healthcare providers can see your colon more clearly  · Flexible sigmoidoscopy  is a test to look for any changes in your lower intestines and rectum  It may also show the cause of any bleeding or pain  A soft, bendable tube with a light on the end will be put into your anus  It will then be moved forward into your intestine  · A colonoscopy  is used to look at your whole colon  A scope (long bendable tube with a light on the end) is used to take pictures  This test may show diverticula  · A CT scan , or CAT scan, may show diverticula  You may be given contrast liquid before the scan  Tell the healthcare provider if you have ever had an allergic reaction to contrast liquid  How is diverticulosis managed? The goal of treatment is to manage any symptoms you have and prevent other problems such as diverticulitis  Diverticulitis is swelling or infection of the diverticula  Your healthcare provider may recommend any of the following:  · Eat a variety of high-fiber foods  High-fiber foods help you have regular bowel movements  High-fiber foods include cooked beans, fruits, vegetables, and some cereals  Most adults need 25 to 35 grams of fiber each day  Your healthcare provider may recommend that you have more  Ask your healthcare provider how much fiber you need  Increase fiber slowly  You may have abdominal discomfort, bloating, and gas if you add fiber to your diet too quickly  You may need to take a fiber supplement if you are not getting enough fiber from food  · Medicines  to soften your bowel movements may be given  You may also need medicines to treat symptoms such as bloating and pain  · Drink liquids as directed  You may need to drink 2 to 3 liters (8 to 12 cups) of liquids every day   Ask your healthcare provider how much liquid to drink each day and which liquids are best for you  · Apply heat  on your abdomen for 20 to 30 minutes every 2 hours for as many days as directed  Heat helps decrease pain and muscle spasms  How can I help prevent diverticulitis or other symptoms? The following may help decrease your risk for diverticulitis or symptoms, such as bleeding  Talk to your provider about these or other things you can do to prevent problems that may occur with diverticulosis  · Exercise regularly  Ask your healthcare provider about the best exercise plan for you  Exercise can help you have regular bowel movements  Get 30 minutes of exercise on most days of the week  · Maintain a healthy weight  Ask your healthcare provider how much you should weigh  Ask him or her to help you create a weight loss plan if you are overweight  · Do not smoke  Nicotine and other chemicals in cigarettes increase your risk for diverticulitis  Ask your healthcare provider for information if you currently smoke and need help to quit  E-cigarettes or smokeless tobacco still contain nicotine  Talk to your healthcare provider before you use these products  · Ask your healthcare provider if it is safe to take NSAIDs  NSAIDs may increase your risk of diverticulitis  When should I seek immediate care? · You have severe pain on the left side of your lower abdomen  · Your bowel movements are bright or dark red  When should I contact my healthcare provider? · You have a fever and chills  · You feel dizzy or lightheaded  · You have nausea, or you are vomiting  · You have a change in your bowel movements  · You have questions or concerns about your condition or care  CARE AGREEMENT:   You have the right to help plan your care  Learn about your health condition and how it may be treated  Discuss treatment options with your healthcare providers to decide what care you want to receive  You always have the right to refuse treatment  The above information is an  only  It is not intended as medical advice for individual conditions or treatments  Talk to your doctor, nurse or pharmacist before following any medical regimen to see if it is safe and effective for you  © Copyright [address] Information is for End User's use only and may not be sold, redistributed or otherwise used for commercial purposes  All illustrations and images included in CareNotes® are the copyrighted property of [employer]  or [address] [place] [address]  Colorectal Polyps   WHAT YOU NEED TO KNOW:   What are colorectal polyps? Colorectal polyps are small growths of tissue in the lining of the colon and rectum  Most polyps are hyperplastic polyps and are usually benign (noncancerous)  Certain types of polyps, called adenomatous polyps, may turn into cancer  What increases my risk of colorectal polyps? The exact cause of colorectal polyps is unknown  The following may increase your risk:  · Older age    · A diet of foods high in fat and low in fiber     · Family history of polyps    · Intestinal diseases, such as Crohn's disease or ulcerative colitis    · An unhealthy lifestyle, such as physical inactivity, smoking, or drinking alcohol    · Obesity    What are the signs and symptoms of colorectal polyps? · Blood in your bowel movement or bleeding from the rectum    · Change in bowel movement habits, such as diarrhea and constipation    · Abdominal pain    How are colorectal polyps diagnosed? You should have fecal blood screening once a year for colorectal disease if you are over 48years old  You should be screened earlier if you have an intestinal disease or a family history of polyps or colorectal cancer  During this screening, a sample of your bowel movement is checked for blood, which may be an early sign of colorectal polyps or cancer   You may also need any of the following tests:  · Digital rectal exam:  Your healthcare provider will examine your anus and use a finger to check your rectum for polyps  · Barium enema: A barium enema is an x-ray of the colon  A tube is put into your anus, and a liquid called barium is put through the tube  Barium is used so that healthcare providers can see your colon better on the x-ray film  · Virtual colonoscopy: This is a CT scan that takes pictures of the inside of your colon and rectum  A small, flexible tube is put into your rectum and air or carbon dioxide (gas) is used to expand your colon  This lets healthcare providers clearly see your colon and any polyps on a monitor  · Colonoscopy or sigmoidoscopy: These procedures help your healthcare provider see the inside of your colon using a flexible tube with a small light and camera on the end  During a sigmoidoscopy, your healthcare provider will only look at rectum and lower colon  During a colonoscopy, healthcare providers will look at the full length of your colon  Healthcare providers may remove a small amount of tissue from the colon for a biopsy  How are colorectal polyps treated? A polypectomy is a minimally invasive procedure to remove your polyps  They may be removed during a colonoscopy or sigmoidoscopy  Your healthcare provider may need to remove the polyps with a laparoscope  Laparoscopy is done by inserting a small, flexible scope into incisions made on your abdomen  What are the risks of colorectal polyps? You may bleed during a colonoscopy procedure  Your bowel may be perforated (torn) when polyps are removed  This may lead to an open abdominal surgery  During surgery, you may bleed too much or get an infection  Adenomatous polyps that are not removed may turn into cancer and become more difficult to treat  Where can I find support and more information?    · Rick Ivy ([facility])  [address], [place] [address]  Phone: [phone]  Web Address: www digestive  niddk nih gov    When should I contact my healthcare provider? · You have a fever  · You have chills, a cough, or feel weak and achy  · You have abdominal pain that does not go away or gets worse after you take medicine  · Your abdomen is swollen  · You are losing weight without trying  · You have questions or concerns about your condition or care  When should I seek immediate care or call 911? · You have sudden shortness of breath  · You have a fast heart rate, fast breathing, or are too dizzy to stand up  · You have severe abdominal pain  · You see blood in your bowel movement  CARE AGREEMENT:   You have the right to help plan your care  Learn about your health condition and how it may be treated  Discuss treatment options with your healthcare providers to decide what care you want to receive  You always have the right to refuse treatment  The above information is an  only  It is not intended as medical advice for individual conditions or treatments  Talk to your doctor, nurse or pharmacist before following any medical regimen to see if it is safe and effective for you  © Copyright [address] Information is for End User's use only and may not be sold, redistributed or otherwise used for commercial purposes  All illustrations and images included in CareNotes® are the copyrighted property of [employer]  or [facility] Fiber Diet   AMBULATORY CARE:   A high-fiber diet  includes foods that have a high amount of fiber  Fiber is the part of fruits, vegetables, and grains that is not broken down by your body  Fiber keeps your bowel movements regular  Fiber can also help lower your cholesterol level, control blood sugar in people with diabetes, and relieve constipation  Fiber can also help you control your weight because it helps you feel full faster  Most adults should eat 25 to 35 grams of fiber each day  Talk to your dietitian or healthcare provider about the amount of fiber you need  Good sources of fiber:       · Foods with at least 4 grams of fiber per serving:      ? ? to ½ cup of high-fiber cereal (check the nutrition label on the box)    ? ½ cup of blackberries or raspberries    ? 4 dried prunes    ? 1 cooked artichoke    ? ½ cup of cooked legumes, such as lentils, or red, kidney, and jacques beans    · Foods with 1 to 3 grams of fiber per serving:      ? 1 slice of whole-wheat, pumpernickel, or rye bread    ? ½ cup of cooked brown rice    ? 4 whole-wheat crackers    ? 1 cup of oatmeal    ? ½ cup of cereal with 1 to 3 grams of fiber per serving (check the nutrition label on the box)    ? 1 small piece of fruit, such as an apple, banana, pear, kiwi, or orange    ? 3 dates    ? ½ cup of canned apricots, fruit cocktail, peaches, or pears    ? ½ cup of raw or cooked vegetables, such as carrots, cauliflower, cabbage, spinach, squash, or corn  Ways that you can increase fiber in your diet:   · Choose brown or wild rice instead of white rice  · Use whole wheat flour in recipes instead of white or all-purpose flour  · Add beans and peas to casseroles or soups  · Choose fresh fruit and vegetables with peels or skins on instead of juices  Other diet guidelines to follow:   · Add fiber to your diet slowly  You may have abdominal discomfort, bloating, and gas if you add fiber to your diet too quickly  · Drink plenty of liquids as you add fiber to your diet  You may have nausea or develop constipation if you do not drink enough water  Ask how much liquid to drink each day and which liquids are best for you  © Copyright [address] Information is for End User's use only and may not be sold, redistributed or otherwise used for commercial purposes   All illustrations and images included in CareNotes® are the copyrighted property of A D A M , Inc  or Percy Daniels  The above information is an  only  It is not intended as medical advice for individual conditions or treatments  Talk to your doctor, nurse or pharmacist before following any medical regimen to see if it is safe and effective for you  Gastric Polyps   WHAT YOU NEED TO KNOW:   Gastric polyps are growths that form in the lining of your stomach  They are not cancerous, but certain types of polyps can change into cancer  DISCHARGE INSTRUCTIONS:   Follow up with your healthcare provider as directed: You may need more tests or procedures  Write down any questions so you remember to ask them at your visits  Medicines:   · Medicines may  be given if you have an infection caused by H  pylori bacteria  · Take your medicine as directed  Contact your healthcare provider if you think your medicine is not helping or if you have side effects  Tell him or her if you are allergic to any medicine  Keep a list of the medicines, vitamins, and herbs you take  Include the amounts, and when and why you take them  Bring the list or the pill bottles to follow-up visits  Carry your medicine list with you in case of an emergency  Seek care immediately if:   · You have blood in your vomit  · You have dark bowel movements  · You have severe pain in your abdomen that does not go away after you take medicine  Contact your healthcare provider if:   · You have indigestion that does not go away with treatment  · You vomit after meals  · You have questions or concerns about your condition or care  © Copyright [address] Information is for End User's use only and may not be sold, redistributed or otherwise used for commercial purposes  All illustrations and images included in CareNotes® are the copyrighted property of A D A M , Inc  or [address] [place], [place] [address]   The above information is an  only  It is not intended as medical advice for individual conditions or treatments   Talk to your doctor, nurse or pharmacist before following any medical regimen to see if it is safe and effective for you  Martínez Esophagus   WHAT YOU NEED TO KNOW:   What is Martínez esophagus? Martínez esophagus is a condition that is also called intestinal metaplasia  Cells that line your esophagus change into cells that are like intestine cells  The change increases your risk for esophageal cancer  What increases my risk for Martínez esophagus? The exact cause of Martínez esophagus is not known  The following may increase your risk:  · Long-term gastroesophageal reflux disease (GERD)    · Age older than 48    · A family history of GERD, Martínez esophagus, or esophageal cancer    · Obesity    · Smoking cigarettes    What are the signs and symptoms of Martínez esophagus? Signs and symptoms are usually related to the signs and symptoms of GERD  You may have any of the following:  · Heartburn (burning pain in your chest)    · Pain after meals that spreads to your neck, jaw, or shoulder    · Pain that gets better when you change positions    · Bitter or acid taste in your mouth    · A dry cough    · Trouble swallowing or pain with swallowing    · Hoarseness or a sore throat    · Burping or hiccups    · Feeling full soon after you start eating    How is Martínez esophagus diagnosed? An endoscopy is a procedure used to see the inside of your esophagus and stomach  A scope is a long, bendable tube with a light on the end of it  A camera may be hooked to the scope  Your healthcare provider may also take tissue samples to be tested for dysplasia (abnormal changes in your cells and tissues)  If dysplasia is found, it will be given a grade to describe the amount of change  The grade can range from negative (no dysplasia) to high-grade (a large amount)  You have a higher risk for cancer with high-grade dysplasia  How is Martínez esophagus treated? Treatment depends on the grade of dysplasia   The goal of treatment is to control your symptoms and prevent esophageal cancer  Your healthcare provider may also suggest that you make changes in the foods you eat and in your lifestyle  You may need any of the following:  · Anti-reflux medicines  help decrease the stomach acid that can irritate your esophagus and stomach  These medicines may include proton pump inhibitors (PPI) and histamine type-2 receptor (H2) blockers  You may also be given medicines to stop vomiting  · Surgery or other procedures  may be done if you have high-grade dysplasia  Abnormal cells may be killed with heat or by freezing them  Light may be used to kill abnormal cells  It is used in combination with medicines that make the abnormal cells sensitive to light  Surgery may be used to wrap the upper part of your stomach around the esophageal sphincter to strengthen it  Surgery may be needed to remove part or all of your esophagus  What can I do to manage Martínez esophagus? · Do not eat foods that make your symptoms worse  Examples are chocolate, garlic, onions, spicy or fatty foods, citrus fruits (oranges), and tomato-based foods (spaghetti sauce)  Do not drink alcohol, drinks that contain caffeine, or carbonated drinks, such as soda  Ask your healthcare provider if there are other foods and drinks you should not have  · Maintain a healthy weight  If you are overweight, weight loss may help relieve symptoms  Ask your healthcare provider about safe ways to lose weight  · Do not smoke  If you smoke, it is never too late to quit  Smoking may worsen acid reflux  Ask your healthcare provider for information if you need help quitting  Where can I get support and more information? · [address]  [facility]  Phone: [phone]  Web Address: [email or web address]  org    Call your local emergency number (911 in the [address]) if:   · You have severe chest pain and shortness of breath  When should I seek immediate care?    · Your bowel movements are black, bloody, or tarry  · Your vomit looks like coffee grounds or has blood in it  When should I call my doctor? · Your symptoms do not improve with treatment  · You have questions or concerns about your condition or care  CARE AGREEMENT:   You have the right to help plan your care  Learn about your health condition and how it may be treated  Discuss treatment options with your healthcare providers to decide what care you want to receive  You always have the right to refuse treatment  The above information is an  only  It is not intended as medical advice for individual conditions or treatments  Talk to your doctor, nurse or pharmacist before following any medical regimen to see if it is safe and effective for you  © Copyright [address] Information is for End User's use only and may not be sold, redistributed or otherwise used for commercial purposes  All illustrations and images included in CareNotes® are the copyrighted property of [employer]  or [facility] Javier Aguilar   Hiatal Hernia   WHAT YOU NEED TO KNOW:   What is a hiatal hernia? A hiatal hernia is a condition that causes part of your stomach to bulge through the hiatus (small opening) in your diaphragm  The part of the stomach may move up and down, or it may get trapped above the diaphragm  What increases my risk for a hiatal hernia? The exact cause of a hiatal hernia is not known  You may have been born with a large hiatus  The following may increase your risk of a hiatal hernia:  · Obesity    · Older age    · Medical conditions such as diverticulosis or esophagitis    · Previous surgery of the esophagus or stomach or trauma such as from a motor vehicle accident    What are the types of hiatal hernia? · Type I (sliding hiatal hernia): A portion of the stomach slides in and out of the hiatus  This type is the most common and usually causes gastroesophageal reflux disease (GERD)   GERD occurs when the esophageal sphincter does not close properly and causes acid reflux  The esophageal sphincter is the lower muscle of the esophagus  · Type II (paraesophageal hiatal hernia):  Type II hiatal hernia forms when a part of the stomach squeezes through the hiatus and lies next to the esophagus  · Type III (combined):  Type III hiatal hernia is a combination of a sliding and a paraesophageal hiatal hernia  · Type IV (complex paraesophageal hiatal hernia): The whole stomach, the small and large bowels, spleen, pancreas, or liver is pushed up into the chest     What are the signs and symptoms of a hiatal hernia? The most common symptom is heartburn  This usually occurs after meals and spreads to your neck, jaw, or shoulder  You may have no signs or symptoms, or you may have any of the following:  · Abdominal pain, especially in the area just above your navel    · Bitter or acid taste in your mouth    · Trouble swallowing    · Coughing or hoarseness    · Chest pain or shortness of breath that occurs after eating    · Frequent burping or hiccups    · Uncomfortable feeling of fullness after eating    How is a hiatal hernia diagnosed? · An upper GI series test  includes x-rays of your esophagus, stomach, and your small intestines  It is also called a barium swallow test  You will be given barium (a chalky liquid) to drink before the pictures are taken  This liquid helps your stomach and intestines show up better on the x-rays  An upper GI series can show if you have an ulcer, a blocked intestine, or other problems  · An endoscopy  uses a scope to see the inside of your digestive tract  A scope is a long, bendable tube with a light on the end of it  A camera may be hooked to the scope to take pictures  How is a hiatal hernia treated? Treatment depends on the type of hiatal hernia you have and on your symptoms  You may not need any treatment   You may need any of the following:  · Medicines  may be given to relieve heartburn symptoms  These medicines help to decrease or block stomach acid  You may also be given medicines that help to tighten the esophageal sphincter  · Surgery  may be done when medicines cannot control your symptoms, or other problems are present  Your healthcare provider may also suggest surgery depending on the type of hernia you have  Your healthcare provider can put your stomach back into its normal location  He may make the hiatus (hole) smaller and anchor your stomach in your abdomen  Fundoplication is a surgery that wraps the upper part of the stomach around the esophageal sphincter to strengthen it  How can I manage symptoms? The following nutrition and lifestyle changes may be recommended to relieve symptoms of heartburn  · Avoid foods that make your symptoms worse  These may include spicy foods, fruit juices, alcohol, caffeine, chocolate, and mint  · Eat several small meals during the day  Small meals give your stomach less food to digest     · Avoid lying down and bending forward after you eat  Do not eat meals 2 to 3 hours before bedtime  This decreases your risk for reflux  · Maintain a healthy weight  If you are overweight, weight loss may help relieve your symptoms  · Sleep with your head elevated  at least 6 inches  · Do not smoke  Smoking can increase your symptoms of heartburn  When should I seek immediate care? · You have severe abdominal pain  · You try to vomit but nothing comes out (retching)  · You have severe chest pain and sudden trouble breathing  · Your bowel movements are black or bloody  · Your vomit looks like coffee grounds or has blood in it  When should I contact my healthcare provider? · Your symptoms are getting worse  · You have nausea, and you are vomiting  · You are losing weight without trying  · You have questions or concerns about your condition or care      CARE AGREEMENT:   You have the right to help plan your care  Learn about your health condition and how it may be treated  Discuss treatment options with your healthcare providers to decide what care you want to receive  You always have the right to refuse treatment  The above information is an  only  It is not intended as medical advice for individual conditions or treatments  Talk to your doctor, nurse or pharmacist before following any medical regimen to see if it is safe and effective for you  © Copyright [address] Information is for End User's use only and may not be sold, redistributed or otherwise used for commercial purposes   All illustrations and images included in CareNotes® are the copyrighted property of A [employer] A "[employer]  or [address] [place] [address]

## 2021-07-14 NOTE — ANESTHESIA POSTPROCEDURE EVALUATION
Post-Op Assessment Note    CV Status:  Stable  Pain Score: 0    Pain management: adequate     Mental Status:  Awake   Hydration Status:  Stable   PONV Controlled:  None   Airway Patency:  Patent      Post Op Vitals Reviewed: Yes      Staff: CRNA         No complications documented      /72 (07/14/21 1337)    Temp 98 °F (36 7 °C) (07/14/21 1337)    Pulse 72 (07/14/21 1337)   Resp 16 (07/14/21 1337)    SpO2 97 % (07/14/21 1337)

## 2022-01-27 ENCOUNTER — ANNUAL EXAM (OUTPATIENT)
Dept: OBGYN CLINIC | Facility: MEDICAL CENTER | Age: 67
End: 2022-01-27
Payer: MEDICARE

## 2022-01-27 ENCOUNTER — TELEPHONE (OUTPATIENT)
Dept: OBGYN CLINIC | Facility: CLINIC | Age: 67
End: 2022-01-27

## 2022-01-27 VITALS
BODY MASS INDEX: 28.25 KG/M2 | SYSTOLIC BLOOD PRESSURE: 108 MMHG | DIASTOLIC BLOOD PRESSURE: 76 MMHG | HEIGHT: 67 IN | WEIGHT: 180 LBS

## 2022-01-27 DIAGNOSIS — Z80.41 FAMILY HISTORY OF OVARIAN CANCER: ICD-10-CM

## 2022-01-27 DIAGNOSIS — Z12.31 ENCOUNTER FOR SCREENING MAMMOGRAM FOR MALIGNANT NEOPLASM OF BREAST: ICD-10-CM

## 2022-01-27 DIAGNOSIS — Z01.419 ENCOUNTER FOR GYNECOLOGICAL EXAMINATION (GENERAL) (ROUTINE) WITHOUT ABNORMAL FINDINGS: Primary | ICD-10-CM

## 2022-01-27 DIAGNOSIS — M85.80 OSTEOPENIA, UNSPECIFIED LOCATION: ICD-10-CM

## 2022-01-27 DIAGNOSIS — L90.0 LICHEN SCLEROSUS ET ATROPHICUS: Primary | ICD-10-CM

## 2022-01-27 DIAGNOSIS — L90.0 LICHEN SCLEROSUS ET ATROPHICUS: ICD-10-CM

## 2022-01-27 PROBLEM — R87.610 PAP SMEAR ABNORMALITY OF CERVIX WITH ASCUS FAVORING BENIGN: Status: ACTIVE | Noted: 2017-12-20

## 2022-01-27 PROCEDURE — G0476 HPV COMBO ASSAY CA SCREEN: HCPCS | Performed by: PHYSICIAN ASSISTANT

## 2022-01-27 PROCEDURE — G0101 CA SCREEN;PELVIC/BREAST EXAM: HCPCS | Performed by: PHYSICIAN ASSISTANT

## 2022-01-27 PROCEDURE — G0145 SCR C/V CYTO,THINLAYER,RESCR: HCPCS | Performed by: PHYSICIAN ASSISTANT

## 2022-01-27 RX ORDER — CLOBETASOL PROPIONATE 0.5 MG/G
CREAM TOPICAL 2 TIMES DAILY
Qty: 30 G | Refills: 4 | Status: SHIPPED | OUTPATIENT
Start: 2022-01-27 | End: 2022-01-27

## 2022-01-27 NOTE — PROGRESS NOTES
Assessment   74 y o  postmenopausal female presenting for annual exam      Plan   Diagnoses and all orders for this visit:    Encounter for gynecological examination (general) (routine) without abnormal findings  -     Liquid-based pap, screening    Encounter for screening mammogram for malignant neoplasm of breast  -     Mammo screening bilateral w 3d & cad; Future    Family history of ovarian cancer    We reviewed potential for genetic predisposition for this type of cancer and currently no definitve screening guidelines for this  Patient declines genetic consult but would like to proceed with pelvic ultrasound for evaluation  Reviewed no concerns on exam today  She is planning to check with insurance prior to scheduling  Osteopenia, unspecified location  -     Calcium Carb-Cholecalciferol (OSCAL-D) 500 mg-200 units per tablet; Take 1 tablet by mouth 2 (two) times a day with meals    Encouraged to continue weight bearing exercise  Has never taken Vit D or calcium but would like rx for this  Sent to pharmacy on file  Will plan to repeat dexa [id number]    Lichen sclerosus et atrophicus  -     clobetasol (TEMOVATE) 0 05 % cream; Apply topically 2 (two) times a day for 14 days    Continue prn clobetasol  Pt repots infrequent flares and sx of itching typically resolve within a day or 2 of tx  Happy with current regimen  Pap done today  Mammo slip given   Colonoscopy due 2024   DEXA due 2023    Reviewed breast awareness/SBE  Encouraged 150 min of exercise per week  Reviewed balanced diet  Vitamin D and Calcium supplement recommended  RTO one year for yearly exam or sooner as needed  __________________________________________________________________      Subjective     Chantal Ku is a 74 y o  postmenopausal female presenting for annual exam  She denies vaginal discharge, itching, odor, bleeding, or dryness  Sexual activity: She is sexually active without pain, bleeding or dryness   She is without complaint  STD testing: She does not want STD testing today  SCREENING  Last Pap: 01/27/2022 LSIL, Neg HPV  Last Mammo: 05/24/2021 BIRADS 1 Negative  Last Colonoscopy: 07/14/2021 3 year recall  Last DEXA: 7/12/21 Osteopenia    GYN  Complaints: denies  Denies pelvic pain, vulvar/vaginal symptoms vaginal discharge, itching, odor, bleeding, or dryness  Hx STI: denies   Last pap: As above  Hx Abnormal pap:   6/2016: normal cyto; no cotesting   6/2017: ASCUS; neg HPV    12/2017: normal pap   7/2018: pap with LSIL  9/2018: colpo with CIN1  2/2019: normal cyto; no cotesting   7/2019: ASCUS, neg HPV      1/2020: LSIL  2/2020: colpo and ECC benign  1/2021 LSIL neg HPV  2/2021 Benign  We reviewed ASCCP guidelines for Pap testing today  Given history will repeat today and make determination on frequency based on these results  OB  [id number]        Complaints: denies  Denies change in urinary stream, dysuria, hematuria, nocturia, urinary frequency/urgency and leakage    BREAST  Complaints: denies  Denies: breast lump, breast tenderness, dryness, nipple discharge, pruritus, skin color change and skin lesion(s)  Last mammogram: As above  Personal hx: none     GENERAL  PMH reviewed/updated and is as below  Patient does follow with a PCP    Exercise: tennis multiple times per week      Pertinent Family Hx:   Family hx of breast cancer: paternal aunt  Family hx of ovarian cancer: MGM  Family hx of colon cancer: no    Past Medical History:   Diagnosis Date    Abnormal Pap smear of cervix     Breast injury     8/1/19    Colon polyp     Disease of thyroid gland     Menopausal state     last assessed [id number]       Past Surgical History:   Procedure Laterality Date    ANKLE SURGERY      LAST ASSESSED [id number]- achilles    COLONOSCOPY      COLPOSCOPY           Current Outpatient Medications:     ibuprofen (MOTRIN) 400 mg tablet, Take 1 tablet (400 mg total) by mouth every 6 (six) hours as needed for mild pain, Disp: , Rfl: 0    levothyroxine 50 mcg tablet, Take 50 mcg by mouth, Disp: , Rfl:     Calcium Carb-Cholecalciferol (OSCAL-D) 500 mg-200 units per tablet, Take 1 tablet by mouth 2 (two) times a day with meals, Disp: 180 tablet, Rfl: 3    clobetasol (TEMOVATE) 0 05 % cream, Apply topically 2 (two) times a day for 14 days, Disp: 30 g, Rfl: 4    No Known Allergies    Social History     Socioeconomic History    Marital status:      Spouse name: Not on file    Number of children: Not on file    Years of education: Not on file    Highest education level: Not on file   Occupational History    Occupation: self emp   Tobacco Use    Smoking status: Never Smoker    Smokeless tobacco: Never Used   Vaping Use    Vaping Use: Never used   Substance and Sexual Activity    Alcohol use: Yes     Comment: SOCIAL    Drug use: Never    Sexual activity: Yes     Partners: Male     Birth control/protection: Post-menopausal   Other Topics Concern    Not on file   Social History Narrative    ** Merged History Encounter **         CAFFEINE USE       Social Determinants of Health     Financial Resource Strain: Not on file   Food Insecurity: Not on file   Transportation Needs: Not on file   Physical Activity: Not on file   Stress: Not on file   Social Connections: Not on file   Intimate Partner Violence: Not on file   Housing Stability: Not on file         Review of Systems     ROS:  Constitutional: Negative for appetite change, fatigue and unexpected weight change  Respiratory: Negative  Cardiovascular: Negative  Gastrointestinal: Negative for abdominal pain and change in bowel habits/constipation/diarrhea  Breasts:  Negative for tenderness, pain or masses  Genitourinary: Negative for difficulty urinating, pelvic pain, vaginal bleeding, vaginal discharge, itching or odor  Psychiatric: Negative for mood difficulties        Objective      /76   Ht 5' 7" (1 702 m)   Wt 81 6 kg (180 lb)   BMI 28 19 kg/m²     Physical Examination:  Patient appears well and is not in distress  Neck is supple without masses  Breasts are symmetrical without mass, tenderness, nipple discharge, skin changes or adenopathy  Abdomen is soft and nontender without masses  External genitals are normal without lesions or rashes  Urethral meatus and urethra are normal  Bladder is normal to palpation  Vagina is normal without discharge or bleeding  Cervix is normal without discharge or lesion  Uterus is normal, mobile, nontender without palpable mass  Adnexa are normal, nontender, without palpable mass  Generalized atrophic changes

## 2022-02-03 LAB
LAB AP GYN PRIMARY INTERPRETATION: NORMAL
Lab: NORMAL

## 2022-07-07 ENCOUNTER — HOSPITAL ENCOUNTER (OUTPATIENT)
Dept: MAMMOGRAPHY | Facility: CLINIC | Age: 67
Discharge: HOME | End: 2022-07-07
Payer: MEDICARE

## 2022-07-07 DIAGNOSIS — Z12.31 ENCOUNTER FOR SCREENING MAMMOGRAM FOR MALIGNANT NEOPLASM OF BREAST: ICD-10-CM

## 2022-07-07 PROCEDURE — 77063 BREAST TOMOSYNTHESIS BI: CPT

## 2022-07-07 PROCEDURE — 77067 SCR MAMMO BI INCL CAD: CPT

## 2023-01-31 ENCOUNTER — ANNUAL EXAM (OUTPATIENT)
Dept: OBGYN CLINIC | Facility: MEDICAL CENTER | Age: 68
End: 2023-01-31

## 2023-01-31 VITALS
SYSTOLIC BLOOD PRESSURE: 112 MMHG | BODY MASS INDEX: 29.42 KG/M2 | HEIGHT: 65 IN | WEIGHT: 176.6 LBS | DIASTOLIC BLOOD PRESSURE: 72 MMHG

## 2023-01-31 DIAGNOSIS — N63.15 UNSPECIFIED LUMP IN THE RIGHT BREAST, OVERLAPPING QUADRANTS: ICD-10-CM

## 2023-01-31 DIAGNOSIS — Z13.820 SCREENING FOR OSTEOPOROSIS: ICD-10-CM

## 2023-01-31 DIAGNOSIS — Z78.0 POSTMENOPAUSAL STATUS: ICD-10-CM

## 2023-01-31 DIAGNOSIS — L90.0 LICHEN SCLEROSUS ET ATROPHICUS: ICD-10-CM

## 2023-01-31 DIAGNOSIS — M85.80 OSTEOPENIA, UNSPECIFIED LOCATION: ICD-10-CM

## 2023-01-31 DIAGNOSIS — Z12.31 ENCOUNTER FOR SCREENING MAMMOGRAM FOR MALIGNANT NEOPLASM OF BREAST: ICD-10-CM

## 2023-01-31 DIAGNOSIS — R87.612 LGSIL ON PAP SMEAR OF CERVIX: ICD-10-CM

## 2023-01-31 DIAGNOSIS — Z01.419 ROUTINE GYNECOLOGICAL EXAMINATION: Primary | ICD-10-CM

## 2023-01-31 RX ORDER — LOSARTAN POTASSIUM 50 MG/1
50 TABLET ORAL DAILY
COMMUNITY
Start: 2023-01-02

## 2023-01-31 NOTE — PATIENT INSTRUCTIONS
Apply the betamethasone twice daily for 6 weeks  Avoid itching/scratching  Return to the office in 6 weeks for a recheck

## 2023-01-31 NOTE — PROGRESS NOTES
Assessment   69 y o  postmenopausal female presenting for annual exam      Plan   Diagnoses and all orders for this visit:    Routine gynecological examination  Normal findings on routine exam    considerations reviewed  Aware of sx to report  Breast awareness/SBE encouraged  Encouraged 150 min of exercise per week  Reviewed balanced diet  Vitamin D and Calcium supplement recommended  Lichen sclerosus et atrophicus  -     betamethasone valerate (VALISONE) 0 1 % ointment; Apply topically 2 (two) times a day    Switched form clobetasol oint to betamethasone last year due to insurance coverage  Previously well controlled with PRN use with no plaques apparent on last annual  She applies the betamethasone x 1 application at onset of itching sx with relief  Last performed this last week with itching and denies current sx  Or vulvar complaints  Reviewed finding of 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 on exam  Poster diagram used for teaching  Advised application of betamethasone ointment twice daily x 6 weeks then RTO for recheck  Reviewed that this is a chronic disorder that can come with frequent recurrences and remission  We discussed potential for architectural changes as a result of this, which can typically be maintained or prevented with use of topical steroid  Encouraged to avoid scratching and reviewed measures to relive/reduce this symptom  Reviewed we may need to change to more of a preventative / maintenance  Also discussed potential biopsy at f/u visit if plaques persistent or changing  Encouraged monitoring of vulvar area for any changes given potential cancerous changes and reviewed the need for yearly examination  She denies dyspareunia but is aware of options for tx if arises  RTO in 6 weeks for recheck  Sooner as needed       LGSIL on Pap smear of cervix  6/2016: normal cyto; no cotesting   6/2017: ASCUS; neg HPV    12/2017: normal pap   7/2018: pap with LSIL  9/2018: colpo with CIN1  2/2019: normal cyto; no cotesting   7/2019: ASCUS, neg HPV      1/2020: LSIL  2/2020: colpo and ECC benign  1/2021 LSIL neg HPV  2/2021 Colpo Benign  1/27/2022 Pap NILM/hpv neg  Reviewed ASCCP guidelines and recommendation to repeat pap in 3 yeas - due 2025    Unspecified lump in the right breast, overlapping quadrants  -     Mammo diagnostic right w 3d & cad; Future    Pea sized mass of the right breast at 3 oclock, 3 cm from areolar border  Well circumscribed and mobile  After self palpation she feels this is a new mass  Recommended diagnostic mammogram / US for evaluation  She is agreeable  Encounter for screening mammogram for malignant neoplasm of breast    - Mammo screening bilateral w 3d & cad; Future    Postmenopausal status  -     DXA bone density spine hip and pelvis; Future    Screening for osteoporosis  -     DXA bone density spine hip and pelvis; Future    Osteopenia, unspecified location  -     DXA bone density spine hip and pelvis; Future    Vitamin D and Calcium supplement encouraged  To increase intake of calcium in diet as well  Reviewed importance of weight bearing exercise  Will plan to monitor DEXA per recommendations         Pap due 2025   Mammo slip given   Colonoscopy due 2024    DEXA due - slip given      RTO one year for yearly exam or sooner as needed  __________________________________________________________________      Subjective     Jenny Barrios is a 69 y o  postmenopausal female presenting for annual exam  She is without complaint and does not want STD testing today  SCREENING  Last Pap: 01/27/2022 NILM/hpv neg  Last Mammo: 07/07/2022 BIRADS 2 - Benign  Last Colonoscopy: 07/14/2021 3 year recall   Last DEXA: 7/12/2021 osteopenia     GYN  Hx of Lichen sclerosus  Reports perineal itching once every 2 months or so  Treats with betamethasone ointment x 1 application with full relief of symptoms   Last had episode of itching last week, but denies current sx  Denies postmenopausal vaginal bleeding, dryness, vaginal discharge, itching, odor, pelvic pain and vulvar/vaginal symptoms    Sexually active: Yes - single partner - male  Sexual concerns: vaginal dryness managed with use of lubricant  Denies pain of bleeding  STD testing, declined  Denies hx of STDs  Hx Abnormal pap:   2016: normal cyto; no cotesting   2017: ASCUS; neg HPV    2017: normal pap   2018: pap with LSIL  2018: colpo with CIN1  2019: normal cyto; no cotesting   2019: ASCUS, neg HPV      2020: LSIL  2020: colpo and ECC benign  2021 LSIL neg HPV  2021 Colpo Benign  2022 Pap NILM/hpv neg  We reviewed ASCCP guidelines for Pap testing today  Repeat due in 3 years  OB          Complaints: denies  Denies leakage/difficulty urinating, dysuria, hematuria, and urinary frequency/urgency      BREAST  Complaints: denies  Denies: breast lump, breast tenderness, dryness, nipple discharge, pruritus, skin color change, and skin lesion(s)  Personal hx: denies     GENERAL  PMH reviewed/updated and is as below  Patient does follow with a PCP    Exercise: continues to play tennis multiple times per week    Pertinent Family Hx:   Family hx of breast cancer: paternal ant  Family hx of ovarian cancer: MGM  Family hx of colon cancer: no      Past Medical History:   Diagnosis Date   • Abnormal Pap smear of cervix    • Breast injury     19   • Colon polyp    • Disease of thyroid gland    • Menopausal state     last assessed 2014       Past Surgical History:   Procedure Laterality Date   • ANKLE SURGERY      LAST ASSESSED [id number]- achilles   • COLONOSCOPY     • COLPOSCOPY           Current Outpatient Medications:   •  betamethasone valerate (VALISONE) 0 1 % ointment, Apply topically 2 (two) times a day, Disp: 30 g, Rfl: 3  •  Calcium Carb-Cholecalciferol (OSCAL-D) 500 mg-200 units per tablet, Take 1 tablet by mouth 2 (two) times a day with meals, Disp: 180 tablet, Rfl: 3  •  levothyroxine 50 mcg tablet, Take 50 mcg by mouth, Disp: , Rfl:   •  losartan (COZAAR) 50 mg tablet, Take 50 mg by mouth daily, Disp: , Rfl:   •  ibuprofen (MOTRIN) 400 mg tablet, Take 1 tablet (400 mg total) by mouth every 6 (six) hours as needed for mild pain (Patient not taking: Reported on 1/31/2023), Disp: , Rfl: 0    No Known Allergies    Social History     Socioeconomic History   • Marital status:      Spouse name: Not on file   • Number of children: Not on file   • Years of education: Not on file   • Highest education level: Not on file   Occupational History   • Occupation: self emp   Tobacco Use   • Smoking status: Never   • Smokeless tobacco: Never   Vaping Use   • Vaping Use: Never used   Substance and Sexual Activity   • Alcohol use: Yes     Comment: SOCIAL   • Drug use: Never   • Sexual activity: Yes     Partners: Male     Birth control/protection: Post-menopausal   Other Topics Concern   • Not on file   Social History Narrative    ** Merged History Encounter **         CAFFEINE USE       Social Determinants of Health     Financial Resource Strain: Not on file   Food Insecurity: Not on file   Transportation Needs: Not on file   Physical Activity: Not on file   Stress: Not on file   Social Connections: Not on file   Intimate Partner Violence: Not on file   Housing Stability: Not on file         Review of Systems     ROS:  Constitutional: Negative for appetite change, fatigue and unexpected weight change  Respiratory: Negative  Cardiovascular: Negative  Gastrointestinal: Negative for abdominal pain and change in bowel habits/constipation/diarrhea  Breasts:  Negative other than as noted above  Genitourinary: Negative other than as noted above  Psychiatric: Negative for mood difficulties        Objective      /72 (BP Location: Left arm, Patient Position: Sitting, Cuff Size: Standard)   Ht 5' 5 25" (1 657 m)   Wt 80 1 kg (176 lb 9 6 oz)   BMI 29 16 kg/m²     Physical Examination:  Patient appears well and is not in distress  Neck is supple without masses  Breasts Pea sized well circumscribed mass at 3 oclock of right breast, 3 cm from areolar border  Nontender  Self palpation by pt requested, she feels this is a new mass  Breasts are otherwise symmetrical without other mass, tenderness, nipple discharge, skin changes or adenopathy  Abdomen is soft and nontender without masses  External genitals: thin white plaque of the perineum and of the clitoral espino  This was not present on annual last year  Urethral meatus and urethra are normal  Bladder is normal to palpation  Vagina is normal without discharge or bleeding  Cervix is normal without discharge or lesion  Uterus is normal, mobile, nontender without palpable mass  Adnexa are normal, nontender, without palpable mass         Physical Exam  Genitourinary:

## 2023-02-06 ENCOUNTER — TELEPHONE (OUTPATIENT)
Dept: OBGYN CLINIC | Facility: CLINIC | Age: 68
End: 2023-02-06

## 2023-02-06 NOTE — TELEPHONE ENCOUNTER
Yes OK to obtain when she returns! Just want to image for evaluation since it was a new finding      Thanks,  Reginald Altamirano

## 2023-02-06 NOTE — TELEPHONE ENCOUNTER
Spoke to pt right breast lump  She wants to know if this is something she needs to worry about  Imaging was ordered  appt 3/10 is that ok       Going away on cruise 2/9-2/27

## 2023-02-06 NOTE — TELEPHONE ENCOUNTER
Pt seen last week for annual and it was found that she has a lump on her breast    She has a few questions about that and was hoping to speak with Barbie Grant if possible

## 2023-03-06 ENCOUNTER — TELEPHONE (OUTPATIENT)
Dept: OBGYN CLINIC | Facility: CLINIC | Age: 68
End: 2023-03-06

## 2023-03-06 NOTE — TELEPHONE ENCOUNTER
Patient wanted to schedule at biopsy of breast, advised that recommendation has to come from the radiologist, has to wait until appointment with them for her imaging to see if necessary

## 2023-03-06 NOTE — TELEPHONE ENCOUNTER
Pt last seen 2/2/23 with Kiana Ray,  Pt has questions regarding her Diagnostic Mammo of her R breast which is Friday 3/10,  pls call pt to advise,   Thanks

## 2023-03-10 ENCOUNTER — HOSPITAL ENCOUNTER (OUTPATIENT)
Dept: MAMMOGRAPHY | Facility: CLINIC | Age: 68
Discharge: HOME | End: 2023-03-10

## 2023-03-10 ENCOUNTER — HOSPITAL ENCOUNTER (OUTPATIENT)
Dept: ULTRASOUND IMAGING | Facility: CLINIC | Age: 68
Discharge: HOME | End: 2023-03-10

## 2023-03-10 DIAGNOSIS — N63.15 UNSPECIFIED LUMP IN THE RIGHT BREAST, OVERLAPPING QUADRANTS: ICD-10-CM

## 2023-06-07 ENCOUNTER — EVALUATION (OUTPATIENT)
Dept: PHYSICAL THERAPY | Facility: CLINIC | Age: 68
End: 2023-06-07
Payer: MEDICARE

## 2023-06-07 DIAGNOSIS — S76.312D HAMSTRING STRAIN, LEFT, SUBSEQUENT ENCOUNTER: Primary | ICD-10-CM

## 2023-06-07 PROCEDURE — 97110 THERAPEUTIC EXERCISES: CPT

## 2023-06-07 PROCEDURE — 97162 PT EVAL MOD COMPLEX 30 MIN: CPT

## 2023-06-07 NOTE — PROGRESS NOTES
PT Evaluation     Today's date: 2023  Patient name: Carina Kate  : 1955  MRN: [id number]  Referring provider: Ryley Florentino MD  Dx:   Encounter Diagnosis     ICD-10-CM    1  Hamstring strain, left, subsequent encounter  S76 717D                      Assessment  Assessment details: Carina Kate is a 77 y o  female who presents with signs and symptoms consistent of L hamstring strain which may be multifactorial in nature and affected by the lumbar spine based off of subjective/objective findings  Patient presents with pain, decreased LE strength, and ambulatory dysfunction with localized pain in the hamstring muscle belly  Patient showed minimal to no neural tension this date but did exhibit benefit with repeated extension  Due to these impairments, Patient has difficulty performing recreational activities and engaging in social activities  Patient would benefit from a comprehensive HEP focusing on improving said deficits  Patient was educated on and provided with an at home exercise program this date to be completed  Patient verbalized understanding of the importance of completion  Patient would benefit from skilled physical therapy to address the impairments, improve their level of function, and to improve their overall quality of life  PT POC 2x/wk for 6 weeks  Thank you for this referral     Impairments: abnormal gait, activity intolerance, impaired physical strength, lacks appropriate home exercise program and pain with function    Symptom irritability: lowUnderstanding of Dx/Px/POC: good   Prognosis: good    Goals  Short Term Goals: to be achieved by 4 weeks  1) Patient to be independent with basic HEP  2) Decrease pain to 2/10 at its worst   3) Increase pain free lumbar ROM by 5-10 degrees   4) Increase LE strength by 1/2 MMT grade in all deficient planes  Long Term Goals: to be achieved by discharge  1) FOTO equal to or greater than expected outcome    2) Ambulation to improve to maximal level of function  3) Patient will be able to return to tennis without pain  4) Patient will be independent with comprehensive HEP  Plan  Patient would benefit from: skilled physical therapy and PT eval  Planned therapy interventions: joint mobilization, manual therapy, massage, activity modification, functional ROM exercises, home exercise program, therapeutic exercise, therapeutic activities, stretching, strengthening, patient education and neuromuscular re-education  Frequency: 2x week  Duration in weeks: 6  Treatment plan discussed with: patient        Subjective Evaluation    History of Present Illness  Mechanism of injury: History of Current Injury: Patient notes an initial L hip injury about a month ago  Patient was playing tennis and was reaching to hit a ball and noticed some increased L hip discomfort  At that time, patient took about a week off of tennis with some noted improvements  Was able to return briefly to tennis but favoring the RLE  When playing this past  she noted more of an increase in pain after a similar stretch for a ball  This has improved somewhat although not to the point where she feels comfortable to play tennis  Pain location/Descriptors: L leg into the thigh and butt   Aggravating factors: Walking, bending, pressure  Easing factors: sitting  Imaging: None   Special Questions: Cait Patton denies a new onset of Bladder incontinence, Bowel dysfunction, Dysphagia, Dysarthria, Diplopia, Tingling, Numbness and Saddle anesthesia     Patient goals:  Get back to playing tennis   Hobbies/Interest: Tennis, walking the dog  Occupation: Retired     Pain  Current pain ratin  At best pain ratin  At worst pain ratin  Quality: dull ache    Patient Goals  Patient goals for therapy: decreased pain, increased strength, return to sport/leisure activities and increased motion          Objective     Tenderness     Additional Tenderness Details  No TTP noted Neurological Testing     Sensation     Lumbar   Left   Intact: light touch    Right   Intact: light touch    Reflexes   Left   Patellar (L4): normal (2+)  Achilles (S1): normal (2+)  Clonus sign: negative    Right   Patellar (L4): normal (2+)  Achilles (S1): normal (2+)  Clonus sign: negative    Active Range of Motion     Lumbar   Flexion: Active lumbar flexion: 75   with pain  Extension: Active lumbar extension: 75   with pain  Left lateral flexion: Active left lumbar lateral flexion: 100  WFL  Right lateral flexion: Active right lumbar lateral flexion: 100  WFL  Left rotation:  WFL  Right rotation:  [facility]  Mechanical Assessment    Cervical      Thoracic      Lumbar    Lying extension: repeated movements  Pain intensity: better    Strength/Myotome Testing     Lumbar   Left   Heel walk: normal  Toe walk: normal    Right   Heel walk: normal  Toe walk: normal    Left Hip   Planes of Motion   Flexion: 5  Extension: 4  Abduction: 4    Right Hip   Planes of Motion   Flexion: 5  Extension: 4  Abduction: 4    Left Knee   Flexion: 4+  Prone flexion: 4+  Extension: 5    Right Knee   Flexion: 5  Prone flexion: 5  Extension: 5    Left Ankle/Foot   Dorsiflexion: 5  Plantar flexion: 5    Right Ankle/Foot   Dorsiflexion: 5  Plantar flexion: 5    Tests     Lumbar     Left   Positive quadrant  Negative passive SLR and slump test      Right   Negative passive SLR, quadrant and slump test      Left Pelvic Girdle/Sacrum   Negative: active SLR test      Right Pelvic Girdle/Sacrum   Negative: active SLR test      Left Hip   Negative SETH and FADIR  Right Hip   Negative SETH and FADIR  Functional Assessment      Squat    Left within functional limits and right within functional limits                Diagnosis: L leg pain    Precautions: none    POC Expires: 7/19   Re-evaluation Date:    FOTO Scores/Date: Goal -;    Visit Count 1/10       Manuals        LAD        Hip Ir/er                Ther Ex        Nustep/bike SLR        Glute bridge HEP       Clamshells         Reverse clamshells         Slump  HEP       Hamstring isomcetrics HEP                       Neuro Re-Ed        Standing hip abduction        Standing hip extension        Hip abd/add isometric                                       Ther Act                                                                                 Modalities

## 2023-06-07 NOTE — LETTER
2023    Jesus Soria, [address] [place]     Patient: Diego Gustafson   YOB: 1955   Date of Visit: 2023     Encounter Diagnosis     ICD-10-CM    1  Hamstring strain, left, subsequent encounter  S76 312D           Dear Dr Anna Bush:    Thank you for your recent referral of Diego Gustafson  Please review the attached evaluation summary from Yahaira's recent visit  Please verify that you agree with the plan of care by signing the attached order  If you have any questions or concerns, please do not hesitate to call  I sincerely appreciate the opportunity to share in the care of one of your patients and hope to have another opportunity to work with you in the near future  Sincerely,    Jose Francisco Newberry, PT      Referring Provider:      I certify that I have read the below Plan of Care and certify the need for these services furnished under this plan of treatment while under my care  Jesus Soria MD  [address] [address]  Via Fax: [phone]          PT Evaluation     Today's date: 2023  Patient name: Diego Gustafson  : 1955  MRN: [id number]  Referring provider: Randell Michel MD  Dx:   Encounter Diagnosis     ICD-10-CM    1  Hamstring strain, left, subsequent encounter  S79 312D                      Assessment  Assessment details: Diego Gustafson is a 77 y o  female who presents with signs and symptoms consistent of L hamstring strain which may be multifactorial in nature and affected by the lumbar spine based off of subjective/objective findings  Patient presents with pain, decreased LE strength, and ambulatory dysfunction with localized pain in the hamstring muscle belly  Patient showed minimal to no neural tension this date but did exhibit benefit with repeated extension   Due to these impairments, Patient has difficulty performing recreational activities and engaging in social activities  Patient would benefit from a comprehensive HEP focusing on improving said deficits  Patient was educated on and provided with an at home exercise program this date to be completed  Patient verbalized understanding of the importance of completion  Patient would benefit from skilled physical therapy to address the impairments, improve their level of function, and to improve their overall quality of life  PT POC 2x/wk for 6 weeks  Thank you for this referral     Impairments: abnormal gait, activity intolerance, impaired physical strength, lacks appropriate home exercise program and pain with function    Symptom irritability: lowUnderstanding of Dx/Px/POC: good   Prognosis: good    Goals  Short Term Goals: to be achieved by 4 weeks  1) Patient to be independent with basic HEP  2) Decrease pain to 2/10 at its worst   3) Increase pain free lumbar ROM by 5-10 degrees   4) Increase LE strength by 1/2 MMT grade in all deficient planes  Long Term Goals: to be achieved by discharge  1) FOTO equal to or greater than expected outcome  2) Ambulation to improve to maximal level of function  3) Patient will be able to return to tennis without pain  4) Patient will be independent with comprehensive HEP  Plan  Patient would benefit from: skilled physical therapy and PT eval  Planned therapy interventions: joint mobilization, manual therapy, massage, activity modification, functional ROM exercises, home exercise program, therapeutic exercise, therapeutic activities, stretching, strengthening, patient education and neuromuscular re-education  Frequency: 2x week  Duration in weeks: 6  Treatment plan discussed with: patient        Subjective Evaluation    History of Present Illness  Mechanism of injury: History of Current Injury: Patient notes an initial L hip injury about a month ago  Patient was playing tennis and was reaching to hit a ball and noticed some increased L hip discomfort   At that time, patient took about a week off of tennis with some noted improvements  Was able to return briefly to tennis but favoring the RLE  When playing this past  she noted more of an increase in pain after a similar stretch for a ball  This has improved somewhat although not to the point where she feels comfortable to play tennis  Pain location/Descriptors: L leg into the thigh and butt   Aggravating factors: Walking, bending, pressure  Easing factors: sitting  Imaging: None   Special Questions: Gian Miranda denies a new onset of Bladder incontinence, Bowel dysfunction, Dysphagia, Dysarthria, Diplopia, Tingling, Numbness and Saddle anesthesia   Patient goals:  Get back to playing tennis   Hobbies/Interest: Tennis, walking the dog  Occupation: Retired     Pain  Current pain ratin  At best pain ratin  At worst pain ratin  Quality: dull ache    Patient Goals  Patient goals for therapy: decreased pain, increased strength, return to sport/leisure activities and increased motion          Objective     Tenderness     Additional Tenderness Details  No TTP noted     Neurological Testing     Sensation     Lumbar   Left   Intact: light touch    Right   Intact: light touch    Reflexes   Left   Patellar (L4): normal (2+)  Achilles (S1): normal (2+)  Clonus sign: negative    Right   Patellar (L4): normal (2+)  Achilles (S1): normal (2+)  Clonus sign: negative    Active Range of Motion     Lumbar   Flexion: Active lumbar flexion: 75   with pain  Extension: Active lumbar extension: 75   with pain  Left lateral flexion: Active left lumbar lateral flexion: 100  WFL  Right lateral flexion: Active right lumbar lateral flexion: 100    WFL  Left rotation:  WFL  Right rotation:  [facility]  Mechanical Assessment    Cervical      Thoracic      Lumbar    Lying extension: repeated movements  Pain intensity: better    Strength/Myotome Testing     Lumbar   Left   Heel walk: normal  Toe walk: normal    Right   Heel walk: normal  Toe walk: normal    Left Hip   Planes of Motion   Flexion: 5  Extension: 4  Abduction: 4    Right Hip   Planes of Motion   Flexion: 5  Extension: 4  Abduction: 4    Left Knee   Flexion: 4+  Prone flexion: 4+  Extension: 5    Right Knee   Flexion: 5  Prone flexion: 5  Extension: 5    Left Ankle/Foot   Dorsiflexion: 5  Plantar flexion: 5    Right Ankle/Foot   Dorsiflexion: 5  Plantar flexion: 5    Tests     Lumbar     Left   Positive quadrant  Negative passive SLR and slump test      Right   Negative passive SLR, quadrant and slump test      Left Pelvic Girdle/Sacrum   Negative: active SLR test      Right Pelvic Girdle/Sacrum   Negative: active SLR test      Left Hip   Negative SETH and FADIR  Right Hip   Negative SETH and FADIR  Functional Assessment      Squat    Left within functional limits and right within functional limits               Diagnosis: L leg pain    Precautions: none    POC Expires: 7/19   Re-evaluation Date:    FOTO Scores/Date: Goal -;    Visit Count 1/10       Manuals        LAD        Hip Ir/er                Ther Ex        Nustep/bike        SLR        Glute bridge HEP       Clamshells         Reverse clamshells         Slump  HEP       Hamstring isomcetrics HEP                       Neuro Re-Ed        Standing hip abduction        Standing hip extension        Hip abd/add isometric                                       Ther Act                                                                                 Modalities

## 2023-06-13 ENCOUNTER — OFFICE VISIT (OUTPATIENT)
Dept: PHYSICAL THERAPY | Facility: CLINIC | Age: 68
End: 2023-06-13
Payer: MEDICARE

## 2023-06-13 DIAGNOSIS — S76.312D HAMSTRING STRAIN, LEFT, SUBSEQUENT ENCOUNTER: Primary | ICD-10-CM

## 2023-06-13 PROCEDURE — 97530 THERAPEUTIC ACTIVITIES: CPT

## 2023-06-13 PROCEDURE — 97112 NEUROMUSCULAR REEDUCATION: CPT

## 2023-06-13 PROCEDURE — 97110 THERAPEUTIC EXERCISES: CPT

## 2023-06-13 NOTE — PROGRESS NOTES
"Daily Note     Today's date: 2023  Patient name: Laurence Pichardo  : 1955  MRN: [id number]  Referring provider: Marylen Rounds, MD  Dx:   Encounter Diagnosis     ICD-10-CM    1  Hamstring strain, left, subsequent encounter  S76 312D           Start Time: 1055  Stop Time: 1148  Total time in clinic (min): 53 minutes    Subjective: Patient notes that she began to get some discomfort especially during slump glides  No major improvement noted over the last week  Objective: See treatment diary below      Assessment: Tolerated treatment well  Strengthened gluteal and hamstring musculature to patient ability  No major improvement noted with PPU  Good tolerance to standing strengthening with no increases in pain/discomfort noted  Improved hamstring sxs noted by end of session with no increases in pain  Progress as able  Patient would benefit from continued PT      Plan: Continue per plan of care        Diagnosis: L leg pain    Precautions: none    POC Expires:    Re-evaluation Date:    FOTO Scores/Date: Goal -;    Visit Count 1/10 2/10      Manuals        LAD        Hip Ir/er                Ther Ex        Nustep/bike  6' L3       SLR  3x10       Glute bridge HEP With abd isometric 2x10       Clamshells         Reverse clamshells         Slump  HEP       Hamstring isomcetrics HEP 20x5\"       PPU  2x10               Neuro Re-Ed        Standing hip abduction  3x10 btb       Standing hip extension  3x10 btb       Hip abd/add isometric        Leg press  3x10 60# DL                             Ther Act             Monster walks  2' btb      Side steps   2' btb      Retro walks   x10 17 5#       Resisted fwd walking                                            Modalities                                            "

## 2023-06-15 ENCOUNTER — OFFICE VISIT (OUTPATIENT)
Dept: PHYSICAL THERAPY | Facility: CLINIC | Age: 68
End: 2023-06-15
Payer: MEDICARE

## 2023-06-15 DIAGNOSIS — S76.312D HAMSTRING STRAIN, LEFT, SUBSEQUENT ENCOUNTER: Primary | ICD-10-CM

## 2023-06-15 PROCEDURE — 97110 THERAPEUTIC EXERCISES: CPT

## 2023-06-15 PROCEDURE — 97112 NEUROMUSCULAR REEDUCATION: CPT

## 2023-06-15 NOTE — PROGRESS NOTES
"Daily Note     Today's date: 6/15/2023  Patient name: Rosendo Gallardo  : 1955  MRN: [id number]  Referring provider: Monty Edge MD  Dx:   Encounter Diagnosis     ICD-10-CM    1  Hamstring strain, left, subsequent encounter  S76 312D           Start Time:   Stop Time: 165  Total time in clinic (min): 40 minutes    Subjective: Patient reports some improvement in walking with the discomfort in her hamstring although continues to note some discomfort sitting which requires her to lean off of the L leg  Will be taking a tennis lesson tomorrow  Objective: See treatment diary below      Assessment: Tolerated treatment well  Responded favorably to prone on elbows and prone press ups with improvement in patient sxs present with ambulation  Progressed LE functional strengthening as well as mobility to patient tolerance  Poor gluteal motor control present with patient struggling with leg press this date although some improvement noted with repetition  Weakness present L>R  Progress as able  Patient would benefit from continued PT      Plan: Continue per plan of care        Diagnosis: L leg pain    Precautions: none    POC Expires:    Re-evaluation Date:    FOTO Scores/Date: Goal -;    Visit Count 1/10 2/10 3/10     Manuals  6/13 6/15     LAD        Hip Ir/er                Ther Ex        Nustep/bike  6' L3  7' L5      SLR  3x10       Glute bridge HEP With abd isometric 2x10  2x10 w ball      Clamshells    2x15 btb      Reverse clamshells         Slump  HEP       Hamstring isomcetrics HEP 20x5\"       PPU  2x10  2x10             DANNIE   3'      Neuro Re-Ed        Standing hip abduction  3x10 btb  2x10 btb     Standing hip extension  3x10 btb  2x10 btb      Hip abd/add isometric        Leg press  3x10 60# DL SL 2x10 30#      Hamstring curl    2x10 DL 22#                     Ther Act             Monster walks  2' btb      Side steps   2' btb      Retro walks   x10 17 5#       Resisted fwd " walking                                            Modalities

## 2023-06-20 ENCOUNTER — OFFICE VISIT (OUTPATIENT)
Dept: PHYSICAL THERAPY | Facility: CLINIC | Age: 68
End: 2023-06-20
Payer: MEDICARE

## 2023-06-20 DIAGNOSIS — S76.312D HAMSTRING STRAIN, LEFT, SUBSEQUENT ENCOUNTER: Primary | ICD-10-CM

## 2023-06-20 PROCEDURE — 97530 THERAPEUTIC ACTIVITIES: CPT

## 2023-06-20 PROCEDURE — 97112 NEUROMUSCULAR REEDUCATION: CPT

## 2023-06-20 PROCEDURE — 97110 THERAPEUTIC EXERCISES: CPT

## 2023-06-20 NOTE — PROGRESS NOTES
"Daily Note     Today's date: 2023  Patient name: Jayesh Ortiz  : 1955  MRN: [id number]  Referring provider: Vicky Appiah MD  Dx:   Encounter Diagnosis     ICD-10-CM    1  Hamstring strain, left, subsequent encounter  S76 312D           Start Time: 845  Stop Time: 928  Total time in clinic (min): 43 minutes    Subjective: Patient reports she was doing well over the last 4 days until trying to catch her dog this morning  When going to grab her dog she notes \"tweaking\" her hamstring  She has been active and even had a tennis lesson last night in which she had no issue  Finds that sitting is her most provacative position still  Objective: See treatment diary below      Assessment: Tolerated treatment well  Due to subjective sxs, did not progress patient this date  Patient showed good tolerance to DANNIE and prone press ups this date with some mild improvements in pain noted  Also shows good tolerance to standing exercise with most discomfort coming in sitting  Educated patient on lumbar roll to which patient was receptive  Progress as able  Patient would benefit from continued PT      Plan: Continue per plan of care        Diagnosis: L leg pain    Precautions: none    POC Expires:    Re-evaluation Date:    FOTO Scores/Date: Goal -;    Visit Count 1/10 2/10 3/10 4/10    Manuals  6/13 6/15 6/20    LAD        Hip Ir/er        LLL    EB     Ther Ex        Nustep/bike  6' L3  7' L5  6' L5     SLR  3x10       Glute bridge HEP With abd isometric 2x10  2x10 w ball  3x10 w/ball     Clamshells    2x15 btb      Reverse clamshells         Slump  HEP       Hamstring isomcetrics HEP 20x5\"       PPU  2x10  2x10 2x10     Prone hip extensions     20x knee bent     DANNIE   3'  3'    Neuro Re-Ed        Standing hip abduction  3x10 btb  2x10 btb 2x10 rtb     Standing hip extension  3x10 btb  2x10 btb  2x10 rtb     Hip abd/add isometric        Leg press  3x10 60# DL SL 2x10 30#  2x10 35#     Hamstring " curl    2x10 DL 22#                     Ther Act             Monster walks  2' btb  4 laps rtb     Side steps   2' btb  4 laps rtb     Retro walks   x10 17 5#       Resisted fwd walking        TRX Mini squats     Band around knees 30x                                 Modalities

## 2023-06-22 ENCOUNTER — OFFICE VISIT (OUTPATIENT)
Dept: PHYSICAL THERAPY | Facility: CLINIC | Age: 68
End: 2023-06-22
Payer: MEDICARE

## 2023-06-22 DIAGNOSIS — S76.312D HAMSTRING STRAIN, LEFT, SUBSEQUENT ENCOUNTER: Primary | ICD-10-CM

## 2023-06-22 PROCEDURE — 97110 THERAPEUTIC EXERCISES: CPT

## 2023-06-22 PROCEDURE — 97112 NEUROMUSCULAR REEDUCATION: CPT

## 2023-06-22 PROCEDURE — 97530 THERAPEUTIC ACTIVITIES: CPT

## 2023-06-22 NOTE — PROGRESS NOTES
"Daily Note     Today's date: 2023  Patient name: Oswald Latham  : 1955  MRN: [id number]  Referring provider: Henri Nathan MD  Dx:   Encounter Diagnosis     ICD-10-CM    1  Hamstring strain, left, subsequent encounter  S76 312D           Start Time:   Stop Time: 1658  Total time in clinic (min): 43 minutes    Subjective: Patient reports that her hamstring feels better today then it did on Tuesday  Sitting continues to aggravate her leg but walking has improved  Objective: See treatment diary below      Assessment: Tolerated treatment well  Progressed patient back to LOF from previous sessions due to subjective reports  Implemented gentle stretching to good tolerance  Good tolerance to RDL and resisted fwd walking without worsening of sxs  No discomfort present during ambulation this date  Progress as able  Patient would benefit from continued PT      Plan: Continue per plan of care        Diagnosis: L leg pain    Precautions: none    POC Expires:    Re-evaluation Date:    FOTO Scores/Date: Goal -;    Visit Count 1/10 2/10 3/10 4/10 5/10   Manuals  6/13 6/15 6/20 6/22   LAD        Hip Ir/er        LLL    EB     Ther Ex        Nustep/bike  6' L3  7' L5  6' L5  5' TM    SLR  3x10       Glute bridge HEP With abd isometric 2x10  2x10 w ball  3x10 w/ball  3x10 w/ball    Clamshells    2x15 btb      Reverse clamshells         Slump  HEP       Hamstring isomcetrics HEP 20x5\"       SKTC     15x5\" L    PPU  2x10  2x10 2x10     Hamstring stretch     15x5\" L    Prone hip extensions     20x knee bent     DANNIE   3'  3'    Neuro Re-Ed        Standing hip abduction  3x10 btb  2x10 btb 2x10 rtb  x15 gtb    Standing hip extension  3x10 btb  2x10 btb  2x10 rtb  x15 gtb    3 way taps         Leg press  3x10 60# DL SL 2x10 30#  2x10 35#  SL 2x10 40#    Hamstring curl    2x10 DL 22#       SL balance          3x30\" yellow foam     Ther Act             Monster walks  2' btb  4 laps rtb     Side steps " 2' btb  4 laps rtb  4 laps gtb    Retro walks   x10 17 5#       Resisted fwd walking     x10 12 5#   TRX Mini squats     Band around knees 30x      SL RDL           to chair 2x10     RDL          2x10 7 5#    Modalities

## 2023-06-27 ENCOUNTER — OFFICE VISIT (OUTPATIENT)
Dept: PHYSICAL THERAPY | Facility: CLINIC | Age: 68
End: 2023-06-27
Payer: MEDICARE

## 2023-06-27 DIAGNOSIS — S76.312D HAMSTRING STRAIN, LEFT, SUBSEQUENT ENCOUNTER: Primary | ICD-10-CM

## 2023-06-27 PROCEDURE — 97140 MANUAL THERAPY 1/> REGIONS: CPT

## 2023-06-27 PROCEDURE — 97110 THERAPEUTIC EXERCISES: CPT

## 2023-06-27 NOTE — PROGRESS NOTES
"Daily Note     Today's date: 2023  Patient name: Rubia Gandara  : 1955  MRN: [id number]  Referring provider: Luis Humphries MD  Dx:   Encounter Diagnosis     ICD-10-CM    1  Hamstring strain, left, subsequent encounter  S76 312D           Start Time: 845  Stop Time: 927  Total time in clinic (min): 42 minutes    Subjective: Patient continues to note discomfort mostly when sitting for prolonged periods of time  Also notes stepping in a pothole yesterday which irritated her L hamstring  Objective: See treatment diary below      Assessment: Tolerated treatment well  PPU and DANNIE improved patient sxs this date with manuals on the lumbar spine reproducing some of patient aching into LLE  This resolved by end of manuals with improvement of sxs noted  Good tolerance to hamstring stretch and SL bridges without worsening of sxs  Provided patient with HEP focusing on lumbar extension  Progress as able  Patient would benefit from continued PT      Plan: Continue per plan of care        Diagnosis: L leg pain    Precautions: none    POC Expires:    Re-evaluation Date:    FOTO Scores/Date: Goal -;    Visit Count 6/10 2/10 3/10 4/10 5/10   Manuals 6/27 6/13 6/15 6/20 6/22   LS Mobs  EB        Hip Ir/er        LLL EB   EB     Ther Ex        Nustep/bike 7:30 TM'  6' L3  7' L5  6' L5  5' TM    SLR  3x10       Glute bridge SL 2x10 ea   With abd isometric 2x10  2x10 w ball  3x10 w/ball  3x10 w/ball    Clamshells    2x15 btb      Reverse clamshells         Slump         Hamstring isomcetrics  20x5\"       SKTC     15x5\" L    PPU 2x10  2x10  2x10 2x10     Hamstring stretch 10x5\" L     15x5\" L    Standing repeated lumbar extensions         Prone hip extensions     20x knee bent     DANNIE 3'   3'  3'    Neuro Re-Ed        Standing hip abduction  3x10 btb  2x10 btb 2x10 rtb  x15 gtb    Standing hip extension  3x10 btb  2x10 btb  2x10 rtb  x15 gtb    3 way taps         Leg press  3x10 60# DL SL 2x10 30#  " "2x10 35#  SL 2x10 40#    Hamstring curl    2x10 DL 22#       SL balance         3x30\" yellow foam     Ther Act            Monster walks  2' btb  4 laps rtb     Side steps   2' btb  4 laps rtb  4 laps gtb    Retro walks   x10 17 5#       Resisted fwd walking     x10 12 5#   TRX Mini squats     Band around knees 30x      SL RDL          to chair 2x10     RDL         2x10 7 5#    Modalities                                                  "

## 2023-06-29 ENCOUNTER — OFFICE VISIT (OUTPATIENT)
Dept: PHYSICAL THERAPY | Facility: CLINIC | Age: 68
End: 2023-06-29
Payer: MEDICARE

## 2023-06-29 DIAGNOSIS — S76.312D HAMSTRING STRAIN, LEFT, SUBSEQUENT ENCOUNTER: Primary | ICD-10-CM

## 2023-06-29 PROCEDURE — 97140 MANUAL THERAPY 1/> REGIONS: CPT

## 2023-06-29 PROCEDURE — 97530 THERAPEUTIC ACTIVITIES: CPT

## 2023-06-29 PROCEDURE — 97110 THERAPEUTIC EXERCISES: CPT

## 2023-06-29 NOTE — PROGRESS NOTES
"Daily Note     Today's date: 2023  Patient name: Tony Frederick  : 1955  MRN: [id number]  Referring provider: Ish Valderrama MD  Dx:   Encounter Diagnosis     ICD-10-CM    1  Hamstring strain, left, subsequent encounter  S76 312D                      Subjective: Patient notes that her sitting tolerance has not improved much and continues to find this is the most uncomfortable position for her  Notes that her walking has improved somewhat  Objective: See treatment diary below      Assessment: Tolerated treatment well  Continues to respond favorably to manuals to mild TTP noted in the lumbar spine  After manuals, loading of the posterior chain showed positive benefit without increases in patient sxs  Some strength deficits noted L>R with impaired motor control present  Encouraged patient to continue with lumbar extensions to minimize sxs  Progress as able  Patient would benefit from continued PT      Plan: Continue per plan of care        Diagnosis: L leg pain    Precautions: none    POC Expires:    Re-evaluation Date:    FOTO Scores/Date: Goal -;    Visit Count 6/10 7/10 3/10 4/10 5/10   Manuals 6/27 6/29 6/15 6/20 6/22   LS Mobs  EB  EB      Hamstring STM  EB      LLL EB   EB     Ther Ex        Nustep/bike 7:30 TM'   7' L5  6' L5  5' TM    SLR        Glute bridge SL 2x10 ea   SL 2x10 ea  2x10 w ball  3x10 w/ball  3x10 w/ball    Clamshells    2x15 btb      Reverse clamshells         Slump         Hamstring isomcetrics        SKTC     15x5\" L    PPU 2x10  2x10  2x10 2x10     Hamstring stretch 10x5\" L  15x5\" L   15x5\" L    Standing repeated lumbar extensions   20x       Prone hip extensions     20x knee bent     DANNIE 3'  3'  3'  3'    Neuro Re-Ed        Standing hip abduction  @ james 2x10 btb 2x10 rtb  x15 gtb    Standing hip extension  @ james 2x10 btb  2x10 rtb  x15 gtb    3 way taps         Leg press  SL 35# 2x12 ea lying flat  SL 2x10 30#  2x10 35#  SL 2x10 40#    Hamstring " "curl    2x10 DL 22#       SL balance        3x30\" yellow foam     Ther Act           Monster walks    4 laps rtb     Side steps     4 laps rtb  4 laps gtb    Retro walks         Resisted fwd walking     x10 12 5#   TRX Mini squats   2x10   Band around knees 30x      SL RDL   To chair 2x10 ea 5# kb       to chair 2x10     RDL   2x10 10# kb      2x10 7 5#    Modalities                                                    "

## 2023-07-05 ENCOUNTER — OFFICE VISIT (OUTPATIENT)
Dept: PHYSICAL THERAPY | Facility: CLINIC | Age: 68
End: 2023-07-05
Payer: MEDICARE

## 2023-07-05 DIAGNOSIS — S76.312D HAMSTRING STRAIN, LEFT, SUBSEQUENT ENCOUNTER: Primary | ICD-10-CM

## 2023-07-05 PROCEDURE — 97140 MANUAL THERAPY 1/> REGIONS: CPT

## 2023-07-05 PROCEDURE — 97112 NEUROMUSCULAR REEDUCATION: CPT

## 2023-07-05 PROCEDURE — 97110 THERAPEUTIC EXERCISES: CPT

## 2023-07-05 NOTE — PROGRESS NOTES
Daily Note     Today's date: 2023  Patient name: Harrisburgjese Faith  : 1955  MRN: [id number]  Referring provider: Enid Brasher MD  Dx:   Encounter Diagnosis     ICD-10-CM    1. Hamstring strain, left, subsequent encounter  S76.312D           Start Time: 930  Stop Time: 1010  Total time in clinic (min): 40 minutes    Subjective: Patient continues to note difficulty sitting and performing rotational activities. Objective: See treatment diary below      Assessment: Tolerated treatment well. Continues to find most relief with lumbar extensions with addition of SKTC and supine nerve glides to patient comfort. Responds well to manuals with no worsening of sxs. Recommended patient to schedule an appointment with an orthopedic provider to obtain a second opinion secondary to lingering sxs. Progress as able. Patient would benefit from continued PT      Plan: Continue per plan of care.       Diagnosis: L leg pain    Precautions: none    POC Expires:    Re-evaluation Date:    FOTO Scores/Date: Goal -;    Visit Count 6/10 7/10 8/10 4/10 5/10   Manuals    LS Mobs  EB  EB EB     Hamstring STM  EB EB     LLL EB   EB     Ther Ex        Nustep/bike 7:30 TM'   6' TM  6' L5  5' TM    SLR        Glute bridge SL 2x10 ea   SL 2x10 ea  SL 2x10 ea  3x10 w/ball  3x10 w/ball    Clamshells         Reverse clamshells         Slump    Supine 20x      Hamstring isomcetrics        SKTC   20x  15x5" L    PPU 2x10  2x10  2x10  2x10     Hamstring stretch 10x5" L  15x5" L   15x5" L    Standing repeated lumbar extensions   20x       Prone hip extensions     20x knee bent     DANNIE 3'  3'  3'  3'    Neuro Re-Ed        Standing hip abduction  @ james 3x10 7.5#  2x10 rtb  x15 gtb    Standing hip extension  @ james 3x10 7.5#  2x10 rtb  x15 gtb    3 way taps         Leg press  SL 35# 2x12 ea lying flat  SL 35# 2x12 ea lying flat 2x10 35#  SL 2x10 40#    Hamstring curl          SL balance       3x30" yellow foam     Ther Act          Monster walks    4 laps rtb     Side steps     4 laps rtb  4 laps gtb    Retro walks         Resisted fwd walking     x10 12.5#   TRX Mini squats   2x10   Band around knees 30x      SL RDL   To chair 2x10 ea 5# kb      to chair 2x10     RDL   2x10 10# kb     2x10 7.5#    Modalities

## 2023-07-07 ENCOUNTER — OFFICE VISIT (OUTPATIENT)
Dept: PHYSICAL THERAPY | Facility: CLINIC | Age: 68
End: 2023-07-07
Payer: MEDICARE

## 2023-07-07 DIAGNOSIS — S76.312D HAMSTRING STRAIN, LEFT, SUBSEQUENT ENCOUNTER: Primary | ICD-10-CM

## 2023-07-07 PROCEDURE — 97112 NEUROMUSCULAR REEDUCATION: CPT

## 2023-07-07 PROCEDURE — 97110 THERAPEUTIC EXERCISES: CPT

## 2023-07-07 PROCEDURE — 97140 MANUAL THERAPY 1/> REGIONS: CPT

## 2023-07-07 NOTE — PROGRESS NOTES
Daily Note     Today's date: 2023  Patient name: Marilu Whalen  : 1955  MRN: [id number]  Referring provider: Jenny Goss MD  Dx:   Encounter Diagnosis     ICD-10-CM    1. Hamstring strain, left, subsequent encounter  S76.312D                      Subjective: Patient notes some improvement in planting and twisting in the hip but notes that she is fearful of returning back to playing tennis, especially with fwd and lateral movements. Objective: See treatment diary below      Assessment: Tolerated treatment well. Maintained level of function with increases in the amount of resistance. No bouts of increases in pain although fatigue noted by end of session. Encouraged patient to maintain current activity level. Progress as able. Patient would benefit from continued PT      Plan: Continue per plan of care.       Diagnosis: L leg pain    Precautions: none    POC Expires:    Re-evaluation Date:    FOTO Scores/Date: Goal -;    Visit Count 6/10 7/10 8/10 9/10 5/10   Manuals    LS Mobs  EB  EB EB EB    Hamstring STM  EB EB EB    LLL EB       Ther Ex        Nustep/bike 7:30 TM'   6' TM  6' TM  5' TM    SLR        Glute bridge SL 2x10 ea   SL 2x10 ea  SL 2x10 ea  2x10 w/ball; SL 2x10 ea  3x10 w/ball    Clamshells         Reverse clamshells         Slump    Supine 20x      Hamstring isomcetrics        SKTC   20x  15x5" L    PPU 2x10  2x10  2x10  2x10     Hamstring stretch 10x5" L  15x5" L   15x5" L    Standing repeated lumbar extensions   20x       Prone hip extensions         DANNIE 3'  3'  3'      Neuro Re-Ed        Standing hip abduction  @ james 3x10 7.5#  2x10 7.5# x15 gtb    Standing hip extension  @ james 3x10 7.5#  2x10 7.5#  x15 gtb    3 way taps         Leg press  SL 35# 2x12 ea lying flat  SL 35# 2x12 ea lying flat SL 50# 2x12 ea lying flat SL 2x10 40#    Hamstring curl          SL balance      3x30" yellow foam     Ther Act         Monster walks        Side steps      4 laps gtb    Retro walks         Resisted fwd walking     x10 12.5#   TRX Mini squats   2x10        SL RDL   To chair 2x10 ea 5# kb     to chair 2x10     RDL   2x10 10# kb 2x10 15# kb    2x10 7.5#    Modalities                                 1:1 with PT from 1233 to 1311

## 2023-07-10 ENCOUNTER — EVALUATION (OUTPATIENT)
Dept: PHYSICAL THERAPY | Facility: CLINIC | Age: 68
End: 2023-07-10
Payer: MEDICARE

## 2023-07-10 DIAGNOSIS — S76.312D HAMSTRING STRAIN, LEFT, SUBSEQUENT ENCOUNTER: Primary | ICD-10-CM

## 2023-07-10 PROCEDURE — 97140 MANUAL THERAPY 1/> REGIONS: CPT

## 2023-07-10 PROCEDURE — 97112 NEUROMUSCULAR REEDUCATION: CPT

## 2023-07-10 PROCEDURE — 97110 THERAPEUTIC EXERCISES: CPT

## 2023-07-10 NOTE — PROGRESS NOTES
PT Re-Evaluation     Today's date: 7/10/2023  Patient name: Carlos Carranza  : 1955  MRN: [id number]  Referring provider: Alvin Hurst MD  Dx:   Encounter Diagnosis     ICD-10-CM    1. Hamstring strain, left, subsequent encounter  S76.312D           Start Time: 845  Stop Time: 0930  Total time in clinic (min): 45 minutes    Assessment  Assessment details: From RE on 7/10/23: Liliya Parker is a 77 y.o. female who presents with signs and symptoms consistent of referring diagnosis based off of subjective/objective findings. Patient has made good progress since initiating OPPT. Patient has made improvements in LE strength, pain free ROM, and functional mobility with decreases in pain which has led to increased activity tolerance. Patient does however continue to present with impaired sitting tolerance and fear avoidance which impacts her quality of life and return to PLOF. Due to these impairments, Patient continues to have difficulty performing a/iadls, recreational activities and engaging in social activities. Patient would continue to benefit from a comprehensive HEP focusing on improving said deficits. Patient would benefit from skilled physical therapy to address the impairments, improve their level of function, and to improve their overall quality of life. PT POC 2x/wk for 6 weeks. Thank you for this referral.      From IE on 23: Carlos Carranza is a 77 y.o. female who presents with signs and symptoms consistent of L hamstring strain which may be multifactorial in nature and affected by the lumbar spine based off of subjective/objective findings. Patient presents with pain, decreased LE strength, and ambulatory dysfunction with localized pain in the hamstring muscle belly. Patient showed minimal to no neural tension this date but did exhibit benefit with repeated extension.  Due to these impairments, Patient has difficulty performing recreational activities and engaging in social activities. Patient would benefit from a comprehensive HEP focusing on improving said deficits. Patient was educated on and provided with an at home exercise program this date to be completed. Patient verbalized understanding of the importance of completion. Patient would benefit from skilled physical therapy to address the impairments, improve their level of function, and to improve their overall quality of life. PT POC 2x/wk for 6 weeks. Thank you for this referral.    Impairments: abnormal gait, activity intolerance, impaired physical strength, lacks appropriate home exercise program and pain with function    Symptom irritability: lowUnderstanding of Dx/Px/POC: good   Prognosis: good    Goals  Short Term Goals: to be achieved by 4 weeks  1) Patient to be independent with basic HEP. - met  2) Decrease pain to 2/10 at its worst.- partially met (2-3)  3) Increase pain free lumbar ROM by 5-10 degrees- met    4) Increase LE strength by 1/2 MMT grade in all deficient planes. - met     Long Term Goals: to be achieved by discharge  1) FOTO equal to or greater than expected outcome. - progressing  2) Ambulation to improve to maximal level of function- met  3) Patient will be able to return to tennis without pain. - not met  4) Patient will be independent with comprehensive HEP. - progressing  5) Patient will be able to tolerate sitting for 30 mins without an increase in pain      Plan  Patient would benefit from: skilled physical therapy  Planned therapy interventions: joint mobilization, manual therapy, massage, activity modification, functional ROM exercises, home exercise program, therapeutic exercise, therapeutic activities, stretching, strengthening, patient education and neuromuscular re-education  Frequency: 2x week  Duration in weeks: 6  Treatment plan discussed with: patient        Subjective Evaluation    History of Present Illness  Mechanism of injury: History of Current Injury: Patient notes an initial L hip injury about a month ago. Patient was playing tennis and was reaching to hit a ball and noticed some increased L hip discomfort. At that time, patient took about a week off of tennis with some noted improvements. Was able to return briefly to tennis but favoring the RLE. When playing this past  she noted more of an increase in pain after a similar stretch for a ball. This has improved somewhat although not to the point where she feels comfortable to play tennis. Pain location/Descriptors: L leg into the thigh and butt   Aggravating factors: Walking, bending, pressure  Easing factors: sitting  Imaging: None   Special Questions: Spring Castañeda denies a new onset of Bladder incontinence, Bowel dysfunction, Dysphagia, Dysarthria, Diplopia, Tingling, Numbness and Saddle anesthesia . Patient goals:  Get back to playing tennis   Hobbies/Interest: Tennis, walking the dog  Occupation: Retired     Pain  Current pain ratin  At best pain ratin  At worst pain rating: 3  Quality: dull ache    Patient Goals  Patient goals for therapy: decreased pain, increased strength, return to sport/leisure activities and increased motion      Spring Castañeda reports a perceived improvement of 60-70%. Patient notes pain has reduced to 2-3. Patient continues to find most difficulty with sitting and finds that she is apprehensive to run, move fwd, cut, and play tennis. Patient has noted improvements in overall strength and pain levels which have allowed for improvements in function. Patient finds that ambulation has improved and does not note much discomfort during this activity. Overall, patient has made steady progress toward goals and would benefit from additional PT at this time.        Objective     Tenderness     Additional Tenderness Details  No TTP noted     Neurological Testing     Sensation     Lumbar   Left   Intact: light touch    Right   Intact: light touch    Reflexes   Left   Patellar (L4): normal (2+)  Achilles (S1): normal (2+)  Clonus sign: negative    Right   Patellar (L4): normal (2+)  Achilles (S1): normal (2+)  Clonus sign: negative    Active Range of Motion     Lumbar   Flexion: Active lumbar flexion: 75.   Extension: Active lumbar extension: 75. Left lateral flexion: Active left lumbar lateral flexion: 100. WFL  Right lateral flexion: Active right lumbar lateral flexion: 100. WFL  Left rotation:  WFL  Right rotation:  [facility]  Mechanical Assessment    Cervical      Thoracic      Lumbar    Lying extension: repeated movements  Pain intensity: better    Strength/Myotome Testing     Lumbar   Left   Heel walk: normal  Toe walk: normal    Right   Heel walk: normal  Toe walk: normal    Left Hip   Planes of Motion   Flexion: 5  Extension: 4+  Abduction: 4+    Right Hip   Planes of Motion   Flexion: 5  Extension: 4+  Abduction: 4+    Left Knee   Flexion: 5  Prone flexion: 4+  Extension: 5    Right Knee   Flexion: 5  Prone flexion: 5  Extension: 5    Left Ankle/Foot   Dorsiflexion: 5  Plantar flexion: 5    Right Ankle/Foot   Dorsiflexion: 5  Plantar flexion: 5    Tests     Lumbar     Left   Negative passive SLR, quadrant and slump test.     Right   Negative passive SLR, quadrant and slump test.     Left Pelvic Girdle/Sacrum   Negative: active SLR test.     Right Pelvic Girdle/Sacrum   Negative: active SLR test.     Left Hip   Negative SETH and FADIR. Right Hip   Negative SETH and FADIR. Functional Assessment      Squat    Left within functional limits and right within functional limits.                  Diagnosis: L leg pain    Precautions: none    POC Expires: 7/19   Re-evaluation Date:    FOTO Scores/Date: Goal -;    Visit Count 6/10 7/10 8/10 9/10 10/10   Manuals 6/27 6/29 7/5 7/7 7/10   LS Mobs  EB  EB EB EB EB   Hamstring STM  EB EB EB    Re-eval      EB   LLL EB       Ther Ex        Nustep/bike 7:30 TM'   6' TM  6' TM  6' TM   SLR        Glute bridge SL 2x10 ea   SL 2x10 ea  SL 2x10 ea  2x10 w/ball; SL 2x10 ea  2x10 w/ball; SL 2x10 ea    Clamshells         Reverse clamshells         Slump    Supine 20x      Hamstring isomcetrics        SKTC   20x     PPU 2x10  2x10  2x10  2x10  2x10   Hamstring stretch 10x5" L  15x5" L      Standing repeated lumbar extensions   20x       Prone hip extensions         DANNIE 3'  3'  3'   2'    Neuro Re-Ed        Standing hip abduction  @ james 3x10 7.5#  2x10 7.5#    Standing hip extension  @ james 3x10 7.5#  2x10 7.5#     3 way taps         Leg press  SL 35# 2x12 ea lying flat  SL 35# 2x12 ea lying flat SL 50# 2x12 ea lying flat SL 40# 2x10 lying flat    Hamstring curl         Lunges      20x ea     Ther Act        Monster walks        Side steps         Retro walks         Resisted fwd walking        TRX Mini squats   2x10        SL RDL   To chair 2x10 ea 5# kb        RDL   2x10 10# kb 2x10 15# kb   2x10 15# kb    Modalities                              Patient re-evaluated x15 mins this date Who Performed The Pdt?: Performed by MD HUBER, RADHA or NP ([id number]) Who Performed The Pdt?: Performed by MD HUBER, RADHA or NP ([id number])

## 2023-07-10 NOTE — LETTER
July 10, 2023    Frankie Massey, [address]    Patient: Agustina Lawrence   YOB: 1955   Date of Visit: 7/10/2023     Encounter Diagnosis     ICD-10-CM    1. Hamstring strain, left, subsequent encounter  S76.312D           Dear Dr. Maeve Kumari:    Thank you for your recent referral of Agustina Lawrence. Please review the attached evaluation summary from Yahaira's recent visit. Please verify that you agree with the plan of care by signing the attached order. If you have any questions or concerns, please do not hesitate to call. I sincerely appreciate the opportunity to share in the care of one of your patients and hope to have another opportunity to work with you in the near future. Sincerely,    Zacarias Peters, PT      Referring Provider:      I certify that I have read the below Plan of Care and certify the need for these services furnished under this plan of treatment while under my care. Frankie Massey MD  [address] [place] [address]  [address] [facility] [address] [address]  Via Fax: [phone]          PT Re-Evaluation     Today's date: 7/10/2023  Patient name: Agustina Lawrence  : 1955  MRN: [id number]  Referring provider: Frankie Massey MD  Dx:   Encounter Diagnosis     ICD-10-CM    1. Hamstring strain, left, subsequent encounter  S76.312D           Start Time: 0845  Stop Time: 0930  Total time in clinic (min): 45 minutes    Assessment  Assessment details: From RE on 7/10/23: Mann Malone is a 77 y.o. female who presents with signs and symptoms consistent of referring diagnosis based off of subjective/objective findings. Patient has made good progress since initiating OPPT. Patient has made improvements in LE strength, pain free ROM, and functional mobility with decreases in pain which has led to increased activity tolerance.  Patient does however continue to present with impaired sitting tolerance and fear avoidance which impacts her quality of life and return to PLOF. Due to these impairments, Patient continues to have difficulty performing a/iadls, recreational activities and engaging in social activities. Patient would continue to benefit from a comprehensive HEP focusing on improving said deficits. Patient would benefit from skilled physical therapy to address the impairments, improve their level of function, and to improve their overall quality of life. PT POC 2x/wk for 6 weeks. Thank you for this referral.      From  on 6/7/23: Markel Christian is a 77 y.o. female who presents with signs and symptoms consistent of L hamstring strain which may be multifactorial in nature and affected by the lumbar spine based off of subjective/objective findings. Patient presents with pain, decreased LE strength, and ambulatory dysfunction with localized pain in the hamstring muscle belly. Patient showed minimal to no neural tension this date but did exhibit benefit with repeated extension. Due to these impairments, Patient has difficulty performing recreational activities and engaging in social activities. Patient would benefit from a comprehensive HEP focusing on improving said deficits. Patient was educated on and provided with an at home exercise program this date to be completed. Patient verbalized understanding of the importance of completion. Patient would benefit from skilled physical therapy to address the impairments, improve their level of function, and to improve their overall quality of life. PT POC 2x/wk for 6 weeks. Thank you for this referral.    Impairments: abnormal gait, activity intolerance, impaired physical strength, lacks appropriate home exercise program and pain with function    Symptom irritability: lowUnderstanding of Dx/Px/POC: good   Prognosis: good    Goals  Short Term Goals: to be achieved by 4 weeks  1) Patient to be independent with basic HEP. - met  2) Decrease pain to 2/10 at its worst.- partially met (2-3)  3) Increase pain free lumbar ROM by 5-10 degrees- met    4) Increase LE strength by 1/2 MMT grade in all deficient planes. - met     Long Term Goals: to be achieved by discharge  1) FOTO equal to or greater than expected outcome. - progressing  2) Ambulation to improve to maximal level of function- met  3) Patient will be able to return to tennis without pain. - not met  4) Patient will be independent with comprehensive HEP. - progressing  5) Patient will be able to tolerate sitting for 30 mins without an increase in pain      Plan  Patient would benefit from: skilled physical therapy  Planned therapy interventions: joint mobilization, manual therapy, massage, activity modification, functional ROM exercises, home exercise program, therapeutic exercise, therapeutic activities, stretching, strengthening, patient education and neuromuscular re-education  Frequency: 2x week  Duration in weeks: 6  Treatment plan discussed with: patient        Subjective Evaluation    History of Present Illness  Mechanism of injury: History of Current Injury: Patient notes an initial L hip injury about a month ago. Patient was playing tennis and was reaching to hit a ball and noticed some increased L hip discomfort. At that time, patient took about a week off of tennis with some noted improvements. Was able to return briefly to tennis but favoring the RLE. When playing this past Sunday she noted more of an increase in pain after a similar stretch for a ball. This has improved somewhat although not to the point where she feels comfortable to play tennis. Pain location/Descriptors: L leg into the thigh and butt   Aggravating factors: Walking, bending, pressure  Easing factors: sitting  Imaging: None   Special Questions: Red Verduzco denies a new onset of Bladder incontinence, Bowel dysfunction, Dysphagia, Dysarthria, Diplopia, Tingling, Numbness and Saddle anesthesia .   Patient goals:  Get back to playing tennis   Hobbies/Interest: Tennis, walking the dog  Occupation: Retired     Pain  Current pain ratin  At best pain ratin  At worst pain rating: 3  Quality: dull ache    Patient Goals  Patient goals for therapy: decreased pain, increased strength, return to sport/leisure activities and increased motion      Spring Castañeda reports a perceived improvement of 60-70%. Patient notes pain has reduced to 2-3. Patient continues to find most difficulty with sitting and finds that she is apprehensive to run, move fwd, cut, and play tennis. Patient has noted improvements in overall strength and pain levels which have allowed for improvements in function. Patient finds that ambulation has improved and does not note much discomfort during this activity. Overall, patient has made steady progress toward goals and would benefit from additional PT at this time. Objective     Tenderness     Additional Tenderness Details  No TTP noted     Neurological Testing     Sensation     Lumbar   Left   Intact: light touch    Right   Intact: light touch    Reflexes   Left   Patellar (L4): normal (2+)  Achilles (S1): normal (2+)  Clonus sign: negative    Right   Patellar (L4): normal (2+)  Achilles (S1): normal (2+)  Clonus sign: negative    Active Range of Motion     Lumbar   Flexion: Active lumbar flexion: 75.   Extension: Active lumbar extension: 75. Left lateral flexion: Active left lumbar lateral flexion: 100. WFL  Right lateral flexion: Active right lumbar lateral flexion: 100.   WFL  Left rotation:  WFL  Right rotation:  [facility]  Mechanical Assessment    Cervical      Thoracic      Lumbar    Lying extension: repeated movements  Pain intensity: better    Strength/Myotome Testing     Lumbar   Left   Heel walk: normal  Toe walk: normal    Right   Heel walk: normal  Toe walk: normal    Left Hip   Planes of Motion   Flexion: 5  Extension: 4+  Abduction: 4+    Right Hip   Planes of Motion   Flexion: 5  Extension: 4+  Abduction: 4+    Left Knee   Flexion: 5  Prone flexion: 4+  Extension: 5    Right Knee   Flexion: 5  Prone flexion: 5  Extension: 5    Left Ankle/Foot   Dorsiflexion: 5  Plantar flexion: 5    Right Ankle/Foot   Dorsiflexion: 5  Plantar flexion: 5    Tests     Lumbar     Left   Negative passive SLR, quadrant and slump test.     Right   Negative passive SLR, quadrant and slump test.     Left Pelvic Girdle/Sacrum   Negative: active SLR test.     Right Pelvic Girdle/Sacrum   Negative: active SLR test.     Left Hip   Negative SETH and FADIR. Right Hip   Negative SETH and FADIR. Functional Assessment      Squat    Left within functional limits and right within functional limits.                 Diagnosis: L leg pain    Precautions: none    POC Expires: 7/19   Re-evaluation Date:    FOTO Scores/Date: Goal -;    Visit Count 6/10 7/10 8/10 9/10 10/10   Manuals 6/27 6/29 7/5 7/7 7/10   LS Mobs  EB  EB EB EB EB   Hamstring STM  EB EB EB    Re-eval      EB   LLL EB       Ther Ex        Nustep/bike 7:30 TM'   6' TM  6' TM  6' TM   SLR        Glute bridge SL 2x10 ea   SL 2x10 ea  SL 2x10 ea  2x10 w/ball; SL 2x10 ea  2x10 w/ball; SL 2x10 ea    Clamshells         Reverse clamshells         Slump    Supine 20x      Hamstring isomcetrics        SKTC   20x     PPU 2x10  2x10  2x10  2x10  2x10   Hamstring stretch 10x5" L  15x5" L      Standing repeated lumbar extensions   20x       Prone hip extensions         DANNIE 3'  3'  3'   2'    Neuro Re-Ed        Standing hip abduction  @ james 3x10 7.5#  2x10 7.5#    Standing hip extension  @ james 3x10 7.5#  2x10 7.5#     3 way taps         Leg press  SL 35# 2x12 ea lying flat  SL 35# 2x12 ea lying flat SL 50# 2x12 ea lying flat SL 40# 2x10 lying flat    Hamstring curl         Lunges      20x ea     Ther Act        Monster walks        Side steps         Retro walks         Resisted fwd walking        TRX Mini squats   2x10        SL RDL   To chair 2x10 ea 5# kb        RDL   2x10 10# kb 2x10 15# kb   2x10 15# kb    Modalities Patient re-evaluated x15 mins this date

## 2023-07-11 ENCOUNTER — TELEPHONE (OUTPATIENT)
Dept: GASTROENTEROLOGY | Facility: CLINIC | Age: 68
End: 2023-07-11

## 2023-07-14 ENCOUNTER — APPOINTMENT (OUTPATIENT)
Dept: PHYSICAL THERAPY | Facility: CLINIC | Age: 68
End: 2023-07-14
Payer: MEDICARE

## 2023-07-17 ENCOUNTER — HOSPITAL ENCOUNTER (OUTPATIENT)
Dept: RADIOLOGY | Facility: MEDICAL CENTER | Age: 68
Discharge: HOME | End: 2023-07-17
Payer: MEDICARE

## 2023-07-17 ENCOUNTER — OFFICE VISIT (OUTPATIENT)
Dept: PHYSICAL THERAPY | Facility: CLINIC | Age: 68
End: 2023-07-17
Payer: MEDICARE

## 2023-07-17 VITALS — BODY MASS INDEX: 27.47 KG/M2 | HEIGHT: 67 IN | WEIGHT: 175 LBS

## 2023-07-17 DIAGNOSIS — S76.312D HAMSTRING STRAIN, LEFT, SUBSEQUENT ENCOUNTER: Primary | ICD-10-CM

## 2023-07-17 DIAGNOSIS — Z12.31 ENCOUNTER FOR SCREENING MAMMOGRAM FOR MALIGNANT NEOPLASM OF BREAST: ICD-10-CM

## 2023-07-17 DIAGNOSIS — Z78.0 POSTMENOPAUSAL STATUS: ICD-10-CM

## 2023-07-17 DIAGNOSIS — Z13.820 SCREENING FOR OSTEOPOROSIS: ICD-10-CM

## 2023-07-17 DIAGNOSIS — M85.80 OSTEOPENIA, UNSPECIFIED LOCATION: ICD-10-CM

## 2023-07-17 PROCEDURE — 77067 SCR MAMMO BI INCL CAD: CPT

## 2023-07-17 PROCEDURE — 77063 BREAST TOMOSYNTHESIS BI: CPT

## 2023-07-17 PROCEDURE — 97112 NEUROMUSCULAR REEDUCATION: CPT

## 2023-07-17 PROCEDURE — 97110 THERAPEUTIC EXERCISES: CPT

## 2023-07-17 PROCEDURE — 97530 THERAPEUTIC ACTIVITIES: CPT

## 2023-07-17 PROCEDURE — 77080 DXA BONE DENSITY AXIAL: CPT

## 2023-07-17 NOTE — PROGRESS NOTES
Daily Note     Today's date: 2023  Patient name: Thuan Moreira  : 1955  MRN: [id number]  Referring provider: Kirstin Evans MD  Dx:   Encounter Diagnosis     ICD-10-CM    1. Hamstring strain, left, subsequent encounter  S76.312D           Start Time: 930  Stop Time: 1010  Total time in clinic (min): 40 minutes    Subjective: Patient reports that the frequency of her pain has reduced since last visit. She notes that she feels she may be able to play tennis but will most likely hold off. Objective: See treatment diary below      Assessment: Tolerated treatment well. Progressed hamstring strengthening and functional mobility this date. Began to add sport specific activities. Hesitation noted in SLS when performing lateral movements due to fear of worsening discomfort. No pain was present by end of session. Encouraged patient to begin to improve activity levels with gradual progression. Progress as able. Patient would benefit from continued PT      Plan: Continue per plan of care.       Diagnosis: L leg pain    Precautions: none    POC Expires:    Re-evaluation Date:    FOTO Scores/Date: Goal -;    Visit Count 1/10 7/10 8/10 9/10 10/10   Manuals 7/17 6/29 7/5 7/7 7/10   LS Mobs   EB EB EB EB   Hamstring STM  EB EB EB    Re-eval      EB   LLL        Ther Ex        Treadmill  6'   6' TM  6' TM  6' TM   SLR        Glute bridge 2x10 SL ea  SL 2x10 ea  SL 2x10 ea  2x10 w/ball; SL 2x10 ea  2x10 w/ball; SL 2x10 ea    Clamshells         Reverse clamshells         Slump    Supine 20x              SKTC   20x     PPU  2x10  2x10  2x10  2x10   Hamstring stretch 15x5" L only 15x5" L      Standing repeated lumbar extensions   20x       Prone hip extensions         DANNIE  3'  3'   2'    Neuro Re-Ed        Standing hip abduction  @ james 3x10 7.5#  2x10 7.5#    Standing hip extension  @ james 3x10 7.5#  2x10 7.5#     3 way taps         Leg press SL 50# 2x10 lying flat  SL 35# 2x12 ea lying flat  SL 35# 2x12 ea lying flat SL 50# 2x12 ea lying flat SL 40# 2x10 lying flat    Hamstring curl  2x10 33# DL        Lunges  Side and fwd/bwd @ stairs x20 ea     20x ea     Ther Act        Monster walks        Side steps         Retro walks         Lat hops  To R only x20 black dot on floor        Fwd jumps  x20        Fwd/bwd shuffle  5 laps        Lateral shuffle  5 laps        TRX Mini squats   2x10        SL RDL  To chair 2x10 10# kb L only  To chair 2x10 ea 5# kb        RDL 20x 10# kb   2x10 10# kb 2x10 15# kb   2x10 15# kb    Modalities

## 2023-07-21 ENCOUNTER — TELEPHONE (OUTPATIENT)
Dept: OBGYN CLINIC | Facility: CLINIC | Age: 68
End: 2023-07-21

## 2023-07-21 ENCOUNTER — OFFICE VISIT (OUTPATIENT)
Dept: PHYSICAL THERAPY | Facility: CLINIC | Age: 68
End: 2023-07-21
Payer: MEDICARE

## 2023-07-21 DIAGNOSIS — S76.312D HAMSTRING STRAIN, LEFT, SUBSEQUENT ENCOUNTER: Primary | ICD-10-CM

## 2023-07-21 PROCEDURE — 97530 THERAPEUTIC ACTIVITIES: CPT

## 2023-07-21 PROCEDURE — 97112 NEUROMUSCULAR REEDUCATION: CPT

## 2023-07-21 PROCEDURE — 97110 THERAPEUTIC EXERCISES: CPT

## 2023-07-21 NOTE — PROGRESS NOTES
Daily Note     Today's date: 2023  Patient name: Chula Vistajese Faith  : 1955  MRN: [id number]  Referring provider: Enid Brasher MD  Dx:   Encounter Diagnosis     ICD-10-CM    1. Hamstring strain, left, subsequent encounter  S76.312D                      Subjective: Patient reports taking 2 tennis lessons this past week. Notes that during she had some minor discomfort but was able to complete without major issue and no lingering pain. Continues to find that sitting is her most provacative position. Objective: See treatment diary below      Assessment: Tolerated treatment well. Continued to progress functional strengthening and tolerance to higher level activities this date to patient tolerance. Some discomfort present with RDL's (did not linger) but no major issue with fwd/bwd running and side stepping. Educated patient on slowly improving the amount of exposure to exercise. Progress as able. Patient would benefit from continued PT      Plan: Continue per plan of care.       Diagnosis: L leg pain    Precautions: none    POC Expires:    Re-evaluation Date:    FOTO Scores/Date: Goal -;    Visit Count 1/10 2/10 8/10 9/10 10/10   Manuals 7/17 7/21 7/5 7/7 7/10   LS Mobs    EB EB EB   Hamstring STM   EB EB    Re-eval      EB   LLL        Ther Ex        Treadmill  6'  7'  6' TM  6' TM  6' TM   SLR        Glute bridge 2x10 SL ea  2x10 SL on  bosu  SL 2x10 ea  2x10 w/ball; SL 2x10 ea  2x10 w/ball; SL 2x10 ea    Clamshells         Reverse clamshells         Slump    Supine 20x              SKTC   20x     PPU   2x10  2x10  2x10   Hamstring stretch 15x5" L only 15x5"       Standing repeated lumbar extensions         Prone hip extensions         DANNIE   3'   2'    Neuro Re-Ed        Standing hip abduction   3x10 7.5#  2x10 7.5#    Standing hip extension   3x10 7.5#  2x10 7.5#     3 way taps         Leg press SL 50# 2x10 lying flat  SL 45# lying flat 2x10  SL 35# 2x12 ea lying flat SL 50# 2x12 ea lying flat SL 40# 2x10 lying flat    Hamstring curl  2x10 33# DL  2x10 33# DL       Lunges  Side and fwd/bwd @ stairs x20 ea     20x ea     Ther Act        Monster walks        Side steps         Retro walks         Lat hops  To R only x20 black dot on floor  20x       Fwd jumps  x20        Fwd/bwd shuffle  5 laps  20x       Lateral shuffle  5 laps  20x       Step up w/quick knee drive  L only 56Z 12"        SL RDL  To chair 2x10 10# kb L only         RDL 20x 10# kb  30x 15# kb  2x10 15# kb   2x10 15# kb    Modalities

## 2023-07-21 NOTE — TELEPHONE ENCOUNTER
----- Message from Gian Petersen PA-C sent at 7/21/2023  6:36 AM EDT -----  Result note sent to patient and not read. Please see result note.  Osteopenia- recommend vitamin D and calcium supplementation if she is not already doing so

## 2023-07-24 ENCOUNTER — APPOINTMENT (OUTPATIENT)
Dept: PHYSICAL THERAPY | Facility: CLINIC | Age: 68
End: 2023-07-24
Payer: MEDICARE

## 2023-08-23 ENCOUNTER — TELEPHONE (OUTPATIENT)
Dept: GASTROENTEROLOGY | Facility: CLINIC | Age: 68
End: 2023-08-23

## 2023-08-23 NOTE — TELEPHONE ENCOUNTER
Scheduled date of EGD(as of today): 9/14/23  Physician performing EGD: Dr. Celia Montanez  Location of EGD: [facility]  Instructions reviewed with patient by:patrick emailed  Clearances: n/a

## 2023-08-23 NOTE — TELEPHONE ENCOUNTER
OA Questions for EGD  Date: [  ]  Screened by: [  ]     Referring Provider: [  ]Dr. Celia Montanez     Pre-Screening: BMI [  ]    Past EGD? If yes - Date: [ recall    ]  Physician/Facility: [   ]  Reason: [   ]     SCHEDULING STAFF: If the patient is over 76years old, please schedule an office visit. ·      Does the patient want to see a gastroenterologist prior to their procedure to discuss any GI symptoms? no  ·      Has the patient been hospitalized or had abdominal surgery in the past 6 months?  no  ·      Does the patient use supplemental oxygen? no  ·      Does the patient take [Coumadin], [Lovenox], [Plavix], [Eliquis],  [Xarelto], or other blood thinning medication? [Yes] [No]  no  ·      Has the patient had a stroke, cardiac event, or stent placed in the past year? [Yes] [No] no     SCHEDULING STAFF: If patient answers NO to the above questions, then schedule the procedure. If patient answers YES to any of the above questions, then schedule an office appointment. ·       If a repeat EGD is belated and patient declines procedure à notify provider.

## 2023-08-23 NOTE — TELEPHONE ENCOUNTER
[address] [facility],[address] Assessment    Name: Paul Khan  YOB: 1955  Last Height: 5' 7" (1.702 m)  Last weight: 79.4 kg (175 lb)  BMI: 27.41 kg/m²  Procedure: Egd  Diagnosis: wakefield's  Date of procedure: 9/14/23  Prep: emailed  Responsible : yes  Phone#: [phone]  Name completing form: Keith Dheeraj  Date form completed: 08/23/23      If the patient answers yes to any of these questions, schedule in a hospital  Are you pregnant: No  Do you rely on a wheelchair for mobility: No  Have you been diagnosed with End Stage Renal Disease (ESRD): No  Do you need oxygen during the day: No  Have you had a heart attack or stroke within the past three months: No  Have you had a seizure within the past three months: No  Have you ever been informed by anesthesia that you have a difficult airway: No  Additional Questions  Have you had any cardiac testing or are under the care of a Cardiologist (see cardiac list): No  Cardiac list:   Do you have an implanted cardiac defibrillator: No (Comment:  This patient should be scheduled in the hospital)    Have any bleeding problems, such as anemia or hemophilia (If patient has H&H result below 8, schedule in hospital.  H&H must be within 30 days of procedure): No    Had an organ transplant within the past 3 months: No    Do you have any present infections: No  Do you get short of breath when walking a few blocks: No  Have you been diagnosed with diabetes: No  Comments (provide cardiac provider information if applicable):

## 2023-09-14 ENCOUNTER — HOSPITAL ENCOUNTER (OUTPATIENT)
Dept: GASTROENTEROLOGY | Facility: AMBULATORY SURGERY CENTER | Age: 68
Discharge: HOME | End: 2023-09-14
Payer: MEDICARE

## 2023-09-14 ENCOUNTER — ANESTHESIA EVENT (OUTPATIENT)
Dept: GASTROENTEROLOGY | Facility: AMBULATORY SURGERY CENTER | Age: 68
End: 2023-09-14

## 2023-09-14 ENCOUNTER — ANESTHESIA (OUTPATIENT)
Dept: GASTROENTEROLOGY | Facility: AMBULATORY SURGERY CENTER | Age: 68
End: 2023-09-14

## 2023-09-14 VITALS
SYSTOLIC BLOOD PRESSURE: 148 MMHG | BODY MASS INDEX: 27.47 KG/M2 | DIASTOLIC BLOOD PRESSURE: 69 MMHG | WEIGHT: 175 LBS | TEMPERATURE: 95.9 F | RESPIRATION RATE: 18 BRPM | HEIGHT: 67 IN | HEART RATE: 56 BPM | OXYGEN SATURATION: 100 %

## 2023-09-14 DIAGNOSIS — Z87.19 HISTORY OF BARRETT'S ESOPHAGUS: ICD-10-CM

## 2023-09-14 DIAGNOSIS — K21.00 PEPTIC REFLUX ESOPHAGITIS: Primary | ICD-10-CM

## 2023-09-14 PROBLEM — I10 HTN (HYPERTENSION): Status: ACTIVE | Noted: 2023-09-14

## 2023-09-14 PROBLEM — K21.9 GASTROESOPHAGEAL REFLUX DISEASE: Status: ACTIVE | Noted: 2023-09-14

## 2023-09-14 PROCEDURE — 88305 TISSUE EXAM BY PATHOLOGIST: CPT | Performed by: PATHOLOGY

## 2023-09-14 PROCEDURE — 88312 SPECIAL STAINS GROUP 1: CPT | Performed by: PATHOLOGY

## 2023-09-14 PROCEDURE — 88342 IMHCHEM/IMCYTCHM 1ST ANTB: CPT | Performed by: PATHOLOGY

## 2023-09-14 PROCEDURE — 43239 EGD BIOPSY SINGLE/MULTIPLE: CPT | Performed by: INTERNAL MEDICINE

## 2023-09-14 PROCEDURE — 88341 IMHCHEM/IMCYTCHM EA ADD ANTB: CPT | Performed by: PATHOLOGY

## 2023-09-14 RX ORDER — PANTOPRAZOLE SODIUM 40 MG/1
40 TABLET, DELAYED RELEASE ORAL DAILY
Qty: 90 TABLET | Refills: 3 | Status: SHIPPED | OUTPATIENT
Start: 2023-09-14

## 2023-09-14 RX ORDER — SODIUM CHLORIDE, SODIUM LACTATE, POTASSIUM CHLORIDE, CALCIUM CHLORIDE 600; 310; 30; 20 MG/100ML; MG/100ML; MG/100ML; MG/100ML
INJECTION, SOLUTION INTRAVENOUS CONTINUOUS PRN
Status: DISCONTINUED | OUTPATIENT
Start: 2023-09-14 | End: 2023-09-14

## 2023-09-14 RX ORDER — LIDOCAINE HYDROCHLORIDE 10 MG/ML
INJECTION, SOLUTION EPIDURAL; INFILTRATION; INTRACAUDAL; PERINEURAL AS NEEDED
Status: DISCONTINUED | OUTPATIENT
Start: 2023-09-14 | End: 2023-09-14

## 2023-09-14 RX ORDER — PROPOFOL 10 MG/ML
INJECTION, EMULSION INTRAVENOUS AS NEEDED
Status: DISCONTINUED | OUTPATIENT
Start: 2023-09-14 | End: 2023-09-14

## 2023-09-14 RX ADMIN — SODIUM CHLORIDE, SODIUM LACTATE, POTASSIUM CHLORIDE, CALCIUM CHLORIDE: 600; 310; 30; 20 INJECTION, SOLUTION INTRAVENOUS at 10:33

## 2023-09-14 RX ADMIN — PROPOFOL 150 MG: 10 INJECTION, EMULSION INTRAVENOUS at 10:37

## 2023-09-14 RX ADMIN — PROPOFOL 50 MG: 10 INJECTION, EMULSION INTRAVENOUS at 10:45

## 2023-09-14 RX ADMIN — PROPOFOL 50 MG: 10 INJECTION, EMULSION INTRAVENOUS at 10:40

## 2023-09-14 RX ADMIN — LIDOCAINE HYDROCHLORIDE 80 MG: 10 INJECTION, SOLUTION EPIDURAL; INFILTRATION; INTRACAUDAL; PERINEURAL at 10:37

## 2023-09-14 NOTE — ANESTHESIA PREPROCEDURE EVALUATION
Procedure:  EGD    Relevant Problems   CARDIO   (+) HTN (hypertension)   (+) Liver hemangioma      ENDO   (+) Acquired hypothyroidism      GI/HEPATIC   (+) Gastroesophageal reflux disease   (+) Liver hemangioma        Physical Exam    Airway      TM Distance: >3 FB  Neck ROM: full     Dental   Comment: None loose, No notable dental hx     Cardiovascular      Pulmonary      Other Findings        Anesthesia Plan  ASA Score- 2     Anesthesia Type- IV sedation with anesthesia with ASA Monitors. Additional Monitors:   Airway Plan:     Comment: NPO after MN    Patient educated on the possibility for awareness under sedation and of the possibility of airway intervention in the event of an airway or procedural emergency  . Plan Factors-Exercise tolerance (METS): >4 METS. Chart reviewed. Patient summary reviewed. Patient is not a current smoker. Induction- intravenous. Postoperative Plan-     Informed Consent- Anesthetic plan and risks discussed with patient. I personally reviewed this patient with the CRNA. Discussed and agreed on the Anesthesia Plan with the CRNA. Laurence Wilson

## 2023-09-14 NOTE — ANESTHESIA POSTPROCEDURE EVALUATION
Post-Op Assessment Note    CV Status:  Stable  Pain Score: 0    Pain management: adequate     Mental Status:  Arousable   Hydration Status:  Stable   PONV Controlled:  None   Airway Patency:  Patent      Post Op Vitals Reviewed: Yes      Staff: Anesthesiologist, CRNA         No notable events documented.     BP   130/72   Temp 98   Pulse 54   Resp 16   SpO2 99

## 2023-09-14 NOTE — DISCHARGE SUMMARY
Discharge Summary - Martinez Brunner 77 y.o. female MRN: [id number]    Unit/Bed#:  Encounter: [id number]    Admission Date: 9/14/2023    Admitting Diagnosis: History of Martínez's esophagus [Z87.19]    HPI: History of chronic reflux    Procedures Performed: No orders of the defined types were placed in this encounter. Summary of Hospital Course: Tolerated procedure    Significant Findings, Care, Treatment and Services Provided: Erosive esophagitis and questionable Martínez's esophagus. Moderate size hiatal hernia. Complications: None    Discharge Diagnosis: See above    Medical Problems     Resolved Problems  Date Reviewed: 8/31/2023   None         Condition at Discharge: good         Discharge instructions/Information to patient and family:   See after visit summary for information provided to patient and family. Provisions for Follow-Up Care:  See after visit summary for information related to follow-up care and any pertinent home health orders.       PCP: Brionna Christiansen MD    Disposition: Home

## 2023-09-19 PROCEDURE — 88312 SPECIAL STAINS GROUP 1: CPT | Performed by: PATHOLOGY

## 2023-09-19 PROCEDURE — 88341 IMHCHEM/IMCYTCHM EA ADD ANTB: CPT | Performed by: PATHOLOGY

## 2023-09-19 PROCEDURE — 88305 TISSUE EXAM BY PATHOLOGIST: CPT | Performed by: PATHOLOGY

## 2023-09-19 PROCEDURE — 88342 IMHCHEM/IMCYTCHM 1ST ANTB: CPT | Performed by: PATHOLOGY

## 2023-12-14 ENCOUNTER — APPOINTMENT (OUTPATIENT)
Dept: LAB | Facility: MEDICAL CENTER | Age: 68
End: 2023-12-14
Payer: MEDICARE

## 2023-12-19 ENCOUNTER — TELEPHONE (OUTPATIENT)
Dept: GASTROENTEROLOGY | Facility: CLINIC | Age: 68
End: 2023-12-19
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
## 2024-02-21 PROBLEM — Z01.419 ENCOUNTER FOR GYNECOLOGICAL EXAMINATION (GENERAL) (ROUTINE) WITHOUT ABNORMAL FINDINGS: Status: RESOLVED | Noted: 2021-01-20 | Resolved: 2024-02-21

## 2024-04-09 ENCOUNTER — OFFICE VISIT (OUTPATIENT)
Dept: OBGYN CLINIC | Facility: MEDICAL CENTER | Age: 69
End: 2024-04-09
Payer: MEDICARE

## 2024-04-09 VITALS — WEIGHT: 184 LBS | BODY MASS INDEX: 28.82 KG/M2 | SYSTOLIC BLOOD PRESSURE: 130 MMHG | DIASTOLIC BLOOD PRESSURE: 90 MMHG

## 2024-04-09 DIAGNOSIS — L90.0 LICHEN SCLEROSUS ET ATROPHICUS: ICD-10-CM

## 2024-04-09 DIAGNOSIS — N90.89 VULVAR LESION: Primary | ICD-10-CM

## 2024-04-09 PROCEDURE — 56605 BIOPSY OF VULVA/PERINEUM: CPT | Performed by: PHYSICIAN ASSISTANT

## 2024-04-09 PROCEDURE — 99214 OFFICE O/P EST MOD 30 MIN: CPT | Performed by: PHYSICIAN ASSISTANT

## 2024-04-09 NOTE — PROGRESS NOTES
Yahaira MERCADO Shantal  1955    S:  68 y.o. female with c/o vulvar lump x 6 months.   Lump is located on inferior right labia majora. Not painful. Sometimes present, and sometimes not able to palpate. Denies vaginal discharge, drainage, burning, itching, or odor.     Last seen for annual exam 1/31/2023. Has hx of lichen sclerosus (white plaque of perineum and clitoral espino at last annual). Reports more frequent itching over the past year. Sometimes multiple times per week, other times will have one day of itching that resolves x weeks after steroid application. Using steroid only when itching intense -- typically only applies once.     Review of Systems   Constitutional: Negative   Gastrointestinal: Negative  Genitourinary: + vulvar mass. Negative for urinary complaints, vaginal discharge, drainage, burning, itching, or odor.     O:  /90 (BP Location: Left arm, Patient Position: Sitting, Cuff Size: Standard)   Wt 83.5 kg (184 lb)   BMI 28.82 kg/m²   She appears well and is in no distress  Normocephalic, atraumatic.   Normal respiratory effort  Abdomen is soft and nontender  External genitals: 1-2 mm annular lesion of the vulva that is faintly purple. Firm and overlying a prominent vein. Lichenoid plaques of clitoral espino, inferior the vaginal opening, and perianal area. The left side of the perineum appears hypertrophic with linear fissures/excoriation markings.   Vagina is without discharge or bleeding.   Cervix is without lesions or discharge. No CMT.  Uterus is nontender, no masses  Adnexa are nontender, no pelvic masses appreciated  No focal neurological deficits.   Normal mood, affect, and behavior.     A/P:  Diagnoses and all orders for this visit:    Vulvar lesion  Reviewed exam findings. Suspect superficial epidermoid cyst overlying varicosity vs angiokeratoma. Reassured of benign features. Recommended monitoring. To call with any changes/concerns.     Lichen sclerosus et atrophicus  -      "betamethasone valerate (VALISONE) 0.1 % ointment; Apply to vulva twice daily for 6 weeks then decrease to once daily use every other day.  -     Biopsy  -     Tissue Exam    Lost to follow up after last annual exam. Reports poor control of chronic sx and frequent flares.   Raised, hypertrophic tissue of plaque of perineum. Biopsy taken to evaluate. Will notify with results.    Reviewed preexisting dx of lichen sclerosus. Discussed that this is a chronic disorder that can come with frequent recurrences and remission. We discussed potential for architectural changes as a result of this, which can typically be maintained or prevented with use of topical steroid. Reviewed potential for cancerous changes and enocuraged regular monitoring. Reviewed the need for yearly examination. Discussed potential for dyspareunia, which she is not experiencing.     For now will check biopsy results. Will have apply clobetasol ointment BID x 6 weeks. RTO in 6-8 weeks for recheck. Discussed tapering to a maintenance dosing of once daily use vs every other day application pending response. Reviewed she would then return to twice daily application for a few weeks with flares. Reviewed importance of adequate tx of each flare.    She is currently very motivated for adequate tx. Recently had a friend dx with ?vaginal/vulvar cancer.     Following biopsy, wound was noted to be hemostatic with application of silver nitrate. Wound care and sx to report were reviewed. She will call with any concerns. All questions answered.       Biopsy    Date/Time: 4/9/2024 7:15 AM    Performed by: Bette Wolf PA-C  Authorized by: Bette Wolf PA-C  Universal Protocol:  Consent: Verbal consent obtained.  Risks and benefits: risks, benefits and alternatives were discussed  Consent given by: patient  Time out: Immediately prior to procedure a \"time out\" was called to verify the correct patient, procedure, equipment, support staff and site/side " marked as required.  Patient understanding: patient states understanding of the procedure being performed  Patient consent: the patient's understanding of the procedure matches consent given  Procedure consent: procedure consent matches procedure scheduled  Relevant documents: relevant documents present and verified  Test results: test results available and properly labeled  Required items: required blood products, implants, devices, and special equipment available  Patient identity confirmed: verbally with patient    Procedure Details - Lesion Biopsy:     Body area:  Anogenital    Anogenital location:  Perineal    Biopsy method: punch biopsy      Biopsy tissue type: skin    Initial size (mm):  4     Biopsy completed without difficulty. Hemostatic with silver nitrate.   Wound care reviewed.  To call with any increasing pain, expanding redness, F/C/S, or purulent drainage.   Will notify with results of biopsy. To RTO in 6-8 weeks for recheck.

## 2024-05-20 ENCOUNTER — TELEPHONE (OUTPATIENT)
Dept: GASTROENTEROLOGY | Facility: CLINIC | Age: 69
End: 2024-05-20

## 2024-05-20 DIAGNOSIS — Z86.010 HX OF ADENOMATOUS COLONIC POLYPS: Primary | ICD-10-CM

## 2024-05-20 NOTE — TELEPHONE ENCOUNTER
Pt is due for a Colon 3 years. hist colon polyps. m dr. davison pt.     05/20/24  Screened by: Candace Aguirre    Referring Provider Dr Davison pt    Pre- Screening:     There is no height or weight on file to calculate BMI.  Has patient been referred for a routine screening Colonoscopy? yes  Is the patient between 45-75 years old? yes      Previous Colonoscopy yes   If yes:    Date: recall     Facility:     Reason:       SCHEDULING STAFF: If the patient is between 45yrs-49yrs, please advise patient to confirm benefits/coverage with their insurance company for a routine screening colonoscopy, some insurance carriers will only cover at 50yrs or older. If the patient is over 75years old, please schedule an office visit.     Does the patient want to see a Gastroenterologist prior to their procedure OR are they having any GI symptoms? no    Has the patient been hospitalized or had abdominal surgery in the past 6 months? no    Does the patient use supplemental oxygen? no    Does the patient take Coumadin, Lovenox, Plavix, Elliquis, Xarelto, or other blood thinning medication? no    Has the patient had a stroke, cardiac event, or stent placed in the past year? no    SCHEDULING STAFF: If patient answers NO to above questions, then schedule procedure. If patient answers YES to above questions, then schedule office appointment.     If patient is between 45yrs - 49yrs, please advise patient that we will have to confirm benefits & coverage with their insurance company for a routine screening colonoscopy.

## 2024-05-20 NOTE — TELEPHONE ENCOUNTER
Scheduled date of colonoscopy (as of today): 8/6/24  Physician performing colonoscopy: Dr Fraser   Location of colonoscopy:    Bowel prep reviewed with patient: Barbara emailed   Instructions reviewed with patient by: ls  Clearances: n/a

## 2024-05-21 ENCOUNTER — OFFICE VISIT (OUTPATIENT)
Dept: OBGYN CLINIC | Facility: MEDICAL CENTER | Age: 69
End: 2024-05-21
Payer: MEDICARE

## 2024-05-21 VITALS
WEIGHT: 180.2 LBS | DIASTOLIC BLOOD PRESSURE: 88 MMHG | HEART RATE: 64 BPM | OXYGEN SATURATION: 96 % | BODY MASS INDEX: 28.28 KG/M2 | SYSTOLIC BLOOD PRESSURE: 126 MMHG | HEIGHT: 67 IN

## 2024-05-21 DIAGNOSIS — L90.0 LICHEN SCLEROSUS ET ATROPHICUS: Primary | ICD-10-CM

## 2024-05-21 DIAGNOSIS — Z12.31 ENCOUNTER FOR SCREENING MAMMOGRAM FOR MALIGNANT NEOPLASM OF BREAST: ICD-10-CM

## 2024-05-21 PROCEDURE — 99213 OFFICE O/P EST LOW 20 MIN: CPT | Performed by: PHYSICIAN ASSISTANT

## 2024-05-21 NOTE — PROGRESS NOTES
Yahaira Murguia  1955      S:  69 y.o. female here for follow up of lichen sclerosus.     Was seeb 4/9/24 with c/o vulvar lesion. Palpated mass noted to be benign in nature, however, exam also revealed worsening of previously dx lichen sclerosus. 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 She was instructed on clobetasol ointment application 1-2 x daily for 6 weeks. Did apply this initially but in recent weeks has stopped using. Denies hernando jorgex, noting improvement from previously reported frequent itching episodes.       Current Outpatient Medications:     Calcium Carb-Cholecalciferol (OSCAL-D) 500 mg-200 units per tablet, Take 1 tablet by mouth 2 (two) times a day with meals, Disp: 180 tablet, Rfl: 3    Calcium Carbonate Antacid (TUMS PO), Take by mouth if needed, Disp: , Rfl:     ibuprofen (MOTRIN) 400 mg tablet, Take 1 tablet (400 mg total) by mouth every 6 (six) hours as needed for mild pain, Disp: , Rfl: 0    benzonatate (TESSALON) 200 MG capsule, Take 1 capsule (200 mg total) by mouth 3 (three) times a day as needed for cough, Disp: 20 capsule, Rfl: 0    betamethasone valerate (VALISONE) 0.1 % ointment, Apply to vulva twice daily for 6 weeks then decrease to once daily use every other day. (Patient not taking: Reported on 5/21/2024), Disp: 30 g, Rfl: 1    levothyroxine 50 mcg tablet, Take 50 mcg by mouth daily (Patient not taking: Reported on 5/21/2024), Disp: , Rfl:     losartan (COZAAR) 50 mg tablet, Take 50 mg by mouth daily (Patient not taking: Reported on 4/9/2024), Disp: , Rfl:     pantoprazole (PROTONIX) 40 mg tablet, Take 1 tablet (40 mg total) by mouth daily (Patient not taking: Reported on 4/9/2024), Disp: 90 tablet, Rfl: 3    polyethylene glycol (GOLYTELY) 4000 mL solution, Take 4,000 mL by mouth once for 1 dose, Disp: 4000 mL, Rfl: 0    predniSONE 20 mg tablet, Take 3 tabs  all at once daily for 3 days then 2 tabs for 2 days then 1 tab for 2 days., Disp: 15 tablet, Rfl: 0  Social History     Socioeconomic History    Marital status:      Spouse name: Not on file    Number of children: Not on file    Years of education: Not on file    Highest education level: Not on file   Occupational History    Occupation: self emp   Tobacco Use    Smoking status: Never    Smokeless tobacco: Never   Vaping Use    Vaping status: Never Used   Substance and Sexual Activity    Alcohol use: Yes     Comment: SOCIAL-weekends    Drug use: Never    Sexual activity: Yes     Partners: Male     Birth control/protection: Post-menopausal   Other Topics Concern    Not on file   Social History Narrative    ** Merged History Encounter **         CAFFEINE USE       Social Determinants of Health     Financial Resource Strain: Not on file   Food Insecurity: Not on file   Transportation Needs: Not on file   Physical Activity: Not on file   Stress: Not on file   Social Connections: Not on file   Intimate Partner Violence: Not on file   Housing Stability: Not on file     Family History   Problem Relation Age of Onset    No Known Problems Mother     Prostate cancer Father     No Known Problems Sister     No Known Problems Daughter     Cancer Maternal Grandmother     Ovarian cancer Maternal Grandmother     No Known Problems Maternal Grandfather     No Known Problems Paternal Grandmother     No Known Problems Paternal Grandfather     No Known Problems Son     No Known Problems Maternal Aunt     No Known Problems Paternal Aunt     Breast cancer Paternal Aunt     No Known Problems Paternal Aunt     No Known Problems Other     Cervical cancer Neg Hx     Uterine cancer Neg Hx      Past Medical History:   Diagnosis Date    Abnormal Pap smear of cervix     Martínez's esophagus     Breast injury     8/1/19    Colon polyp     Disease of thyroid gland     GERD (gastroesophageal reflux disease)     Hypertension     Menopausal state  "    last assessed [id number]       ROS:  Constitutional: Negative.  Genitourinary: Negative for itching, irritation, vaginal bleeding, oor burning       O:  Blood pressure 126/88, pulse 64, height 5' 7\" (1.702 m), weight 81.7 kg (180 lb 3.2 oz), SpO2 96%.    She appears well and is in no distress  Normocephalic, atraumatic.   Normal respiratory effort   External genitals - Still with hypopigmentaiton/thin white plaques of clitoral espino, perineum, and perianal area. Hypertrophic tissue has resolved. No excoriation markings.   No focal neurological deficits.   Normal mood, affect, and behavior.     A/P:  Diagnoses and all orders for this visit:    Lichen sclerosus et atrophicus    - betamethasone valerate (VALISONE) 0.1 % ointment; Apply to vulva once, every other day. Increase to twice daily application for two weeks with a flare.  Dispense: 30 g; Refill: 3    Reviewed exam findings and biopsy result.   Recommended clobetasol application once per day, every other day for maintenance. To increase to BID use x 2 weeks with flare. To RTO if >3-4 flares per year, or sooner PRN.   Otherwise to RTO for annual exam - is scheduled     Encounter for screening mammogram for malignant neoplasm of breast  -     Mammo screening bilateral w 3d & cad; Future        "

## 2024-08-05 ENCOUNTER — TELEPHONE (OUTPATIENT)
Age: 69
End: 2024-08-05

## 2024-08-06 ENCOUNTER — ANESTHESIA EVENT (OUTPATIENT)
Dept: GASTROENTEROLOGY | Facility: HOSPITAL | Age: 69
End: 2024-08-06

## 2024-08-06 ENCOUNTER — ANESTHESIA (OUTPATIENT)
Dept: GASTROENTEROLOGY | Facility: HOSPITAL | Age: 69
End: 2024-08-06

## 2024-08-06 ENCOUNTER — HOSPITAL ENCOUNTER (OUTPATIENT)
Dept: GASTROENTEROLOGY | Facility: HOSPITAL | Age: 69
Setting detail: OUTPATIENT SURGERY
Discharge: HOME | End: 2024-08-06
Attending: INTERNAL MEDICINE
Payer: MEDICARE

## 2024-08-06 VITALS
SYSTOLIC BLOOD PRESSURE: 121 MMHG | DIASTOLIC BLOOD PRESSURE: 59 MMHG | BODY MASS INDEX: 28.19 KG/M2 | RESPIRATION RATE: 12 BRPM | HEIGHT: 67 IN | HEART RATE: 66 BPM | TEMPERATURE: 97.1 F | OXYGEN SATURATION: 95 %

## 2024-08-06 DIAGNOSIS — Z86.010 HX OF ADENOMATOUS POLYP OF COLON: ICD-10-CM

## 2024-08-06 DIAGNOSIS — Z86.010 HX OF COLONIC POLYPS: ICD-10-CM

## 2024-08-06 PROCEDURE — G0105 COLORECTAL SCRN; HI RISK IND: HCPCS | Performed by: INTERNAL MEDICINE

## 2024-08-06 RX ORDER — PROPOFOL 10 MG/ML
INJECTION, EMULSION INTRAVENOUS AS NEEDED
Status: DISCONTINUED | OUTPATIENT
Start: 2024-08-06 | End: 2024-08-06

## 2024-08-06 RX ORDER — SODIUM CHLORIDE, SODIUM LACTATE, POTASSIUM CHLORIDE, CALCIUM CHLORIDE 600; 310; 30; 20 MG/100ML; MG/100ML; MG/100ML; MG/100ML
INJECTION, SOLUTION INTRAVENOUS CONTINUOUS PRN
Status: DISCONTINUED | OUTPATIENT
Start: 2024-08-06 | End: 2024-08-06

## 2024-08-06 RX ORDER — LIDOCAINE HYDROCHLORIDE 10 MG/ML
INJECTION, SOLUTION EPIDURAL; INFILTRATION; INTRACAUDAL; PERINEURAL AS NEEDED
Status: DISCONTINUED | OUTPATIENT
Start: 2024-08-06 | End: 2024-08-06

## 2024-08-06 RX ADMIN — LIDOCAINE HYDROCHLORIDE 50 MG: 10 INJECTION, SOLUTION EPIDURAL; INFILTRATION; INTRACAUDAL at 09:20

## 2024-08-06 RX ADMIN — PROPOFOL 120 MG: 10 INJECTION, EMULSION INTRAVENOUS at 09:20

## 2024-08-06 RX ADMIN — SODIUM CHLORIDE, SODIUM LACTATE, POTASSIUM CHLORIDE, AND CALCIUM CHLORIDE: .6; .31; .03; .02 INJECTION, SOLUTION INTRAVENOUS at 09:18

## 2024-08-06 NOTE — ANESTHESIA PREPROCEDURE EVALUATION
Procedure:  COLONOSCOPY    Relevant Problems   CARDIO   (+) HTN (hypertension)   (+) Liver hemangioma      ENDO   (+) Acquired hypothyroidism      GI/HEPATIC   (+) Gastroesophageal reflux disease   (+) Liver hemangioma        Physical Exam    Airway    Mallampati score: II  TM Distance: >3 FB  Neck ROM: full     Dental       Cardiovascular  Cardiovascular exam normal    Pulmonary  Pulmonary exam normal     Other Findings  post-pubertal.      Anesthesia Plan  ASA Score- 2     Anesthesia Type- IV sedation with anesthesia with ASA Monitors.         Additional Monitors:     Airway Plan:            Plan Factors-    Chart reviewed.   Existing labs reviewed. Patient summary reviewed.                  Induction- intravenous.    Postoperative Plan-         Informed Consent- Anesthetic plan and risks discussed with patient.  I personally reviewed this patient with the CRNA. Discussed and agreed on the Anesthesia Plan with the CRNA..

## 2024-08-06 NOTE — H&P
History and Physical -  [facility]  Yahaira Murguia 69 y.o. female MRN: [id number]                  HPI: Yahaira Murguia is a 69 y.o. year old female who presents for history of polyp      REVIEW OF SYSTEMS: Per the HPI, and otherwise unremarkable.    Historical Information   Past Medical History:   Diagnosis Date    Abnormal Pap smear of cervix     Martínez's esophagus     Breast injury     8/1/19    Colon polyp     Disease of thyroid gland     GERD (gastroesophageal reflux disease)     Hypertension     Menopausal state     last assessed 24JUN2014     Past Surgical History:   Procedure Laterality Date    ANKLE SURGERY      LAST ASSESSED 24JUN2014- [facility]    CATARACT EXTRACTION      COLONOSCOPY      COLPOSCOPY      UPPER GASTROINTESTINAL ENDOSCOPY       Social History   Social History     Substance and Sexual Activity   Alcohol Use Yes    Comment: SOCIAL-weekends     Social History     Substance and Sexual Activity   Drug Use Never     Social History     Tobacco Use   Smoking Status Never   Smokeless Tobacco Never     Family History   Problem Relation Age of Onset    No Known Problems Mother     Prostate cancer Father     No Known Problems Sister     No Known Problems Daughter     Cancer Maternal Grandmother     Ovarian cancer Maternal Grandmother     No Known Problems Maternal Grandfather     No Known Problems Paternal Grandmother     No Known Problems Paternal Grandfather     No Known Problems Son     No Known Problems Maternal Aunt     No Known Problems Paternal Aunt     Breast cancer Paternal Aunt     No Known Problems Paternal Aunt     No Known Problems Other     Cervical cancer Neg Hx     Uterine cancer Neg Hx        Meds/Allergies       Current Outpatient Medications:     benzonatate (TESSALON) 200 MG capsule    betamethasone valerate (VALISONE) 0.1 % ointment    Calcium Carb-Cholecalciferol (OSCAL-D) 500 mg-200 units per tablet    ibuprofen (MOTRIN) 400 mg tablet     "levothyroxine 50 mcg tablet    Calcium Carbonate Antacid (TUMS PO)    losartan (COZAAR) 50 mg tablet    pantoprazole (PROTONIX) 40 mg tablet    polyethylene glycol (GOLYTELY) 4000 mL solution    predniSONE 20 mg tablet    No Known Allergies    Objective     /73   Pulse 63   Temp 97.5 °F (36.4 °C) (Tympanic)   Resp 16   Ht 5' 7\" (1.702 m)   SpO2 99%   BMI 28.19 kg/m²       PHYSICAL EXAM    Gen: NAD  Head: NCAT  CV: RRR  CHEST: Clear  ABD: soft, NT/ND  EXT: no edema      ASSESSMENT/PLAN:  This is a 69 y.o. year old female here for colonoscopy, and she is stable and optimized for her procedure.        "

## 2024-08-06 NOTE — ANESTHESIA POSTPROCEDURE EVALUATION
Post-Op Assessment Note    CV Status:  Stable  Pain Score: 0    Pain management: adequate       Mental Status:  Sleepy and arousable   Hydration Status:  Euvolemic and stable   PONV Controlled:  Controlled   Airway Patency:  Patent     Post Op Vitals Reviewed: Yes    No anethesia notable event occurred.    Staff: CRNA               BP   109/59   Temp   97.1   Pulse  51   Resp   15   SpO2   98%

## 2024-08-23 ENCOUNTER — TELEPHONE (OUTPATIENT)
Age: 69
End: 2024-08-23

## 2024-11-20 ENCOUNTER — HOSPITAL ENCOUNTER (OUTPATIENT)
Dept: RADIOLOGY | Facility: MEDICAL CENTER | Age: 69
Discharge: HOME | End: 2024-11-20
Payer: MEDICARE

## 2024-11-20 VITALS — WEIGHT: 175 LBS | HEIGHT: 67 IN | BODY MASS INDEX: 27.47 KG/M2

## 2024-11-20 DIAGNOSIS — Z12.31 ENCOUNTER FOR SCREENING MAMMOGRAM FOR MALIGNANT NEOPLASM OF BREAST: ICD-10-CM

## 2024-11-20 PROCEDURE — 77067 SCR MAMMO BI INCL CAD: CPT

## 2024-11-20 PROCEDURE — 77063 BREAST TOMOSYNTHESIS BI: CPT

## 2025-01-13 ENCOUNTER — APPOINTMENT (OUTPATIENT)
Dept: LAB | Facility: MEDICAL CENTER | Age: 70
End: 2025-01-13
Payer: MEDICARE

## 2025-01-13 DIAGNOSIS — K21.9 GASTROESOPHAGEAL REFLUX DISEASE WITHOUT ESOPHAGITIS: ICD-10-CM

## 2025-01-13 DIAGNOSIS — I51.9 MYXEDEMA HEART DISEASE: ICD-10-CM

## 2025-01-13 DIAGNOSIS — E03.9 MYXEDEMA HEART DISEASE: ICD-10-CM

## 2025-01-13 DIAGNOSIS — M79.601 RIGHT UPPER LIMB PAIN: ICD-10-CM

## 2025-01-13 DIAGNOSIS — R10.13 ABDOMINAL PAIN, EPIGASTRIC: ICD-10-CM

## 2025-01-13 LAB
AMYLASE SERPL-CCNC: 94 IU/L (ref 29–103)
LIPASE SERPL-CCNC: 17 U/L (ref 11–82)
T4 FREE SERPL-MCNC: 0.93 NG/DL (ref 0.61–1.12)
TSH SERPL DL<=0.05 MIU/L-ACNC: 4.74 UIU/ML (ref 0.45–4.5)

## 2025-01-13 PROCEDURE — 83690 ASSAY OF LIPASE: CPT

## 2025-01-13 PROCEDURE — 36415 COLL VENOUS BLD VENIPUNCTURE: CPT

## 2025-01-13 PROCEDURE — 84443 ASSAY THYROID STIM HORMONE: CPT

## 2025-01-13 PROCEDURE — 82150 ASSAY OF AMYLASE: CPT

## 2025-01-13 PROCEDURE — 84439 ASSAY OF FREE THYROXINE: CPT

## 2025-01-17 ENCOUNTER — APPOINTMENT (OUTPATIENT)
Dept: URGENT CARE | Facility: MEDICAL CENTER | Age: 70
End: 2025-01-17

## 2025-06-10 ENCOUNTER — ANNUAL EXAM (OUTPATIENT)
Dept: OBGYN CLINIC | Facility: MEDICAL CENTER | Age: 70
End: 2025-06-10
Payer: MEDICARE

## 2025-06-10 VITALS
HEIGHT: 67 IN | DIASTOLIC BLOOD PRESSURE: 82 MMHG | BODY MASS INDEX: 27.18 KG/M2 | SYSTOLIC BLOOD PRESSURE: 128 MMHG | WEIGHT: 173.2 LBS

## 2025-06-10 DIAGNOSIS — Z80.41 FAMILY HISTORY OF OVARIAN CANCER: ICD-10-CM

## 2025-06-10 DIAGNOSIS — Z01.419 ENCOUNTER FOR ANNUAL ROUTINE GYNECOLOGICAL EXAMINATION: Primary | ICD-10-CM

## 2025-06-10 DIAGNOSIS — L90.0 LICHEN SCLEROSUS ET ATROPHICUS: ICD-10-CM

## 2025-06-10 DIAGNOSIS — R87.612 LGSIL ON PAP SMEAR OF CERVIX: ICD-10-CM

## 2025-06-10 DIAGNOSIS — M85.80 OSTEOPENIA, UNSPECIFIED LOCATION: ICD-10-CM

## 2025-06-10 DIAGNOSIS — Z13.820 SCREENING FOR OSTEOPOROSIS: ICD-10-CM

## 2025-06-10 DIAGNOSIS — Z78.0 POSTMENOPAUSAL: ICD-10-CM

## 2025-06-10 DIAGNOSIS — Z12.31 ENCOUNTER FOR SCREENING MAMMOGRAM FOR MALIGNANT NEOPLASM OF BREAST: ICD-10-CM

## 2025-06-10 PROBLEM — R87.610 PAP SMEAR ABNORMALITY OF CERVIX WITH ASCUS FAVORING BENIGN: Status: RESOLVED | Noted: 2017-12-20 | Resolved: 2025-06-10

## 2025-06-10 PROCEDURE — G0101 CA SCREEN;PELVIC/BREAST EXAM: HCPCS | Performed by: PHYSICIAN ASSISTANT

## 2025-06-10 NOTE — ASSESSMENT & PLAN NOTE
Biopsy 2023 confirming dx, otherwise benign.   Flares q 4-6 weeks. Resolves with application of valisone ointment x 1-2 days. Does not need refill at this time. Advised BID application x 6 weeks, RTO for f/u to ensure improvement in tissue.

## 2025-06-10 NOTE — ASSESSMENT & PLAN NOTE
Long standing hx of abnormal pap smears. Most recently with CN 1 on pap smears 2020 and 2021. Subsequent colposcopies in 2020 and 2021 both benign. Pap 2022 NILM/hpv neg. Due for repeat pap this year, which was inadvertently not collected today. She is scheduled for a f/u OV 7/2025 and is aware pap will be collected at that time.

## 2025-06-10 NOTE — PROGRESS NOTES
Name: Yahaira Murguia      : 1955      MRN: [id number]  Encounter Provider: Bette Wolf PA-C  Encounter Date: 6/10/2025   Encounter department: [facility]  :  Assessment & Plan  Encounter for annual routine gynecological examination  Cervical cancer screening:  Normal cervical exam. Pap /inadvertantely not collected. Pt will RTO 25 for f/u and pap to be collected at that time.     STD screening:  Patient declines.   Breast cancer screening:  Normal breast exam. Reviewed breast self-awareness. Mammo ordered. Colon cancer screening:  up to date - return as clinically indicated, no further screenings advised.     Depression Screening: Patient's depression screening was assessed with a PHQ-2 score of 0.   BMI Counseling: Body mass index is 27.13 kg/m². Discussed diet and exercise recommendations.  Return to office 1 year for annual exam, sooner PRN / otherwise directed .         Encounter for screening mammogram for malignant neoplasm of breast    Orders:    Mammo screening bilateral w 3d and cad; Future    LGSIL on Pap smear of cervix  Long standing hx of abnormal pap smears. Most recently with CN 1 on pap smears  and . Subsequent colposcopies in  and  both benign. Pap  NILM/hpv neg. Due for repeat pap this year, which was inadvertently not collected today. She is scheduled for a f/u OV 2025 and is aware pap will be collected at that time.         Family history of ovarian cancer  MGM. Reviewed sx to report.        Lichen sclerosus et atrophicus  Biopsy  confirming dx, otherwise benign.   Flares q 4-6 weeks. Resolves with application of valisone ointment x 1-2 days. Does not need refill at this time. Advised BID application x 6 weeks, RTO for f/u to ensure improvement in tissue.        Osteopenia, unspecified location  DEXA due in the next year, ordered.   Advised to continue her Vitamin D and Calcium supplement.   To  increase intake of calcium in diet as well.   Encouraged to continue weight bearing exercise and reviewed importance of this    Orders:    DXA bone density spine hip and pelvis; Future    Postmenopausal    Orders:    DXA bone density spine hip and pelvis; Future    Screening for osteoporosis    Orders:    DXA bone density spine hip and pelvis; Future        History of Present Illness   Yahaira Murguia is a 70 y.o. postmenopausal female presenting for annual exam. She is without complaint and denies  bleeding.     Hx of lichen sclerosus. Applies steroid ointment 1-2 x q 4-6 weeks with slight itching. This relieves sx fully and has never required use for longer than a few days.   Biopsy taken at last physical in 2023. Declines valisone refill at this time.     Remains active. Plays tennis 4-5 times per week. Occasionally with some right shoulder pain but feels this relates more to sleeping wrong than from exercise. Will monitor and will call if needs ortho referral.   Still working. Dating, things going well.     Last Pap: 1/2022 NILM/hpv neg.   Last Mammo: 11/20/2024   Last Colonoscopy: 08/06/2024 - return as indicated, no further screening advised  Last DEXA: 7/2023 - osteopenia     Sexually active: yes    Concerns: denies pain, bleeding, dryness     Hx Abnormal pap:   6/2016: normal cyto; no cotesting   6/2017: ASCUS; neg HPV .  12/2017: normal pap.  7/2018: pap with LSIL  9/2018: colpo with CIN1  2/2019: normal cyto; no cotesting   7/2019: ASCUS, neg HPV.    1/2020: LSIL  2/2020: colpo and ECC benign  1/2021 LSIL neg HPV  2/2021 Colpo Benign  1/27/2022 Pap NILM/hpv neg.     We reviewed ASCCP guidelines for Pap testing today. Pap due.     Urinary concerns: rare YANDEL - not bothersome. Declines intervention.   Breast concerns denies       Pertinent Family Hx:   Family hx of breast cancer: paternal ant  Family hx of ovarian cancer: MGM  Family hx of colon cancer: no            Review of Systems  "  Constitutional: Negative.    Gastrointestinal: Negative.    Genitourinary:  Negative for difficulty urinating, dysuria, frequency, pelvic pain, urgency, vaginal bleeding and vaginal discharge.   Skin: Negative.    Neurological:  Negative for headaches.   Psychiatric/Behavioral: Negative.       Medical History Reviewed by provider this encounter:     .  Medications Ordered Prior to Encounter[1]   Social History[2]     Objective   /82 (BP Location: Left arm, Patient Position: Sitting, Cuff Size: Standard)   Ht 5' 7\" (1.702 m)   Wt 78.6 kg (173 lb 3.2 oz)   BMI 27.13 kg/m²      Physical Exam  Vitals and nursing note reviewed.   Constitutional:       General: She is not in acute distress.     Appearance: Normal appearance.   HENT:      Head: Normocephalic and atraumatic.     Eyes:      Conjunctiva/sclera: Conjunctivae normal.     Pulmonary:      Effort: Pulmonary effort is normal.   Abdominal:      General: There is no distension.      Palpations: Abdomen is soft.      Tenderness: There is no abdominal tenderness.   Genitourinary:     General: Normal vulva.      Labia:         Right: No rash or lesion.         Left: No rash or lesion.       Vagina: Normal.      Cervix: No cervical motion tenderness, discharge or lesion.      Uterus: Normal.       Adnexa: Right adnexa normal and left adnexa normal.     Lymphadenopathy:      Lower Body: No right inguinal adenopathy. No left inguinal adenopathy.     Skin:     General: Skin is warm and dry.     Neurological:      General: No focal deficit present.      Mental Status: She is alert.      Cranial Nerves: No cranial nerve deficit.     Psychiatric:         Mood and Affect: Mood normal.         Behavior: Behavior normal.            [1]   Current Outpatient Medications on File Prior to Visit   Medication Sig Dispense Refill    betamethasone valerate (VALISONE) 0.1 % ointment Apply to vulva once, every other day. Increase to twice daily application for two weeks with a " flare. 30 g 3    Calcium Carbonate Antacid (TUMS PO) Take by mouth if needed      levothyroxine 50 mcg tablet Take 50 mcg by mouth daily      benzonatate (TESSALON) 200 MG capsule Take 1 capsule (200 mg total) by mouth 3 (three) times a day as needed for cough 20 capsule 0    Calcium Carb-Cholecalciferol (OSCAL-D) 500 mg-200 units per tablet Take 1 tablet by mouth 2 (two) times a day with meals (Patient not taking: Reported on 6/10/2025) 180 tablet 3    ibuprofen (MOTRIN) 400 mg tablet Take 1 tablet (400 mg total) by mouth every 6 (six) hours as needed for mild pain (Patient not taking: Reported on 6/10/2025)  0    losartan (COZAAR) 50 mg tablet Take 50 mg by mouth daily (Patient not taking: Reported on 4/9/2024)      pantoprazole (PROTONIX) 40 mg tablet Take 1 tablet (40 mg total) by mouth daily (Patient not taking: Reported on 4/9/2024) 90 tablet 3    polyethylene glycol (GOLYTELY) 4000 mL solution Take 4,000 mL by mouth once for 1 dose 4000 mL 0    predniSONE 20 mg tablet Take 3 tabs all at once daily for 3 days then 2 tabs for 2 days then 1 tab for 2 days. 15 tablet 0     No current facility-administered medications on file prior to visit.   [2]   Social History  Tobacco Use    Smoking status: Never    Smokeless tobacco: Never   Vaping Use    Vaping status: Never Used   Substance and Sexual Activity    Alcohol use: Yes     Comment: SOCIAL-weekends    Drug use: Never    Sexual activity: Yes     Partners: Male     Birth control/protection: Post-menopausal

## 2025-07-23 DIAGNOSIS — L90.0 LICHEN SCLEROSUS ET ATROPHICUS: ICD-10-CM
